# Patient Record
Sex: MALE | Race: WHITE | NOT HISPANIC OR LATINO | Employment: FULL TIME | URBAN - METROPOLITAN AREA
[De-identification: names, ages, dates, MRNs, and addresses within clinical notes are randomized per-mention and may not be internally consistent; named-entity substitution may affect disease eponyms.]

---

## 2017-01-09 ENCOUNTER — HOSPITAL ENCOUNTER (OUTPATIENT)
Dept: BEHAVIORAL HEALTH | Facility: CLINIC | Age: 32
Discharge: HOME | End: 2017-01-09
Attending: SOCIAL WORKER | Admitting: SOCIAL WORKER
Payer: COMMERCIAL

## 2017-01-09 ENCOUNTER — HOSPITAL ENCOUNTER (OUTPATIENT)
Dept: BEHAVIORAL HEALTH | Facility: CLINIC | Age: 32
End: 2017-01-09
Attending: SOCIAL WORKER
Payer: COMMERCIAL

## 2017-01-09 PROBLEM — F19.20 CHEMICAL DEPENDENCY (H): Status: ACTIVE | Noted: 2017-01-09

## 2017-01-09 PROCEDURE — H2035 A/D TX PROGRAM, PER HOUR: HCPCS | Mod: HQ

## 2017-01-09 PROCEDURE — H0001 ALCOHOL AND/OR DRUG ASSESS: HCPCS

## 2017-01-09 ASSESSMENT — PAIN SCALES - GENERAL: PAINLEVEL: MODERATE PAIN (4)

## 2017-01-09 ASSESSMENT — ANXIETY QUESTIONNAIRES
1. FEELING NERVOUS, ANXIOUS, OR ON EDGE: NEARLY EVERY DAY
5. BEING SO RESTLESS THAT IT IS HARD TO SIT STILL: MORE THAN HALF THE DAYS
6. BECOMING EASILY ANNOYED OR IRRITABLE: SEVERAL DAYS
7. FEELING AFRAID AS IF SOMETHING AWFUL MIGHT HAPPEN: MORE THAN HALF THE DAYS
4. TROUBLE RELAXING: MORE THAN HALF THE DAYS
3. WORRYING TOO MUCH ABOUT DIFFERENT THINGS: MORE THAN HALF THE DAYS
GAD7 TOTAL SCORE: 15
2. NOT BEING ABLE TO STOP OR CONTROL WORRYING: NEARLY EVERY DAY

## 2017-01-09 NOTE — PROGRESS NOTES
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 insight into the effects his use has had on his physical and mental health. Pt displays moderate vulnerability for further substance use.       DIMENSION VI - Recovery Environment   1. Are you employed/attending school? Tell me about that.     He was working 8:30-3pm Monday through Friday.    2A. Describe a typical day; evening for you. Work, school, social, leisure, volunteer, spiritual practices. Include time spent obtaining, using, recovering from drugs or alcohol. (DSM)     \"wake up, use, go to work, maybe use around lunch time, work, leave work, use, go home.\"    2B. How often do you spend more time than you planned using or use more than you planned? (DSM)     \"not really. I made sure everything was made up.\"    3. How important is using to your social connections? Do many of your family or friends use?     Friends: no    4A. Are you currently in a significant relationship?     Yes.  4B. How long? 2 years. But have been off and on for the past 9 years.    4C. Sexual Orientation:     Heterosexual    5A. Who do you live with?      His girlfriend and her 4 year old son.    5B. Tell me about their alcohol/drug use and mental health issues.     She drinks a little and is stopping. She has " "not used heroin in 7-8 years.    5C. Are you concerned for your safety there? No    5D. Are you concerned about the safety of anyone else who lives with you? No    6A. Do you have children who live with you?     Yes.  (Ask follow-up questions to determine the person s relationship and responsibility, both legal and care giving; and what arrangements are made for supervision for the children when the person is not available.) 4 year old step-son    6B. Do you have children who do not live with you?     No    7A. Who supports you in making changes in your alcohol or drug use? What are they willing to do to support you? Who is upset or angry about you making changes in your alcohol or drug use? How big a problem is this for you?      \"friends and family.\"    7B. This table is provided to record information about the person s relationships and available support It is not necessary to ask each item; only to get a comprehensive picture of their support system.  How often can you count on the following people when you need someone?   Partner / Spouse Always supportive   Parent(s)/Aunt(s)/Uncle(s)/Grandparents Usually supportive- mom; Sometimes- dad   Sibling(s)/Cousin(s) N/A   Child(daniel) N/A   Other relative(s) N/A   Friend(s)/neighbor(s) Always supportive   Child(daniel) s father(s)/mother(s) N/A   Support group member(s) Usually supportive   Community of gracie members Always supportive   /counselor/therapist/healer Always supportive   Other (specify) N/A     8A. What is your current living situation?     He lives with his girlfriend and her 4 year old son in a place they rent.    8B. What is your long term plan for where you will be living?     \"We want to buy the house.\"    8C. Tell me about your living environment/neighborhood? Ask enough follow up questions to determine safety, criminal activity, availability of alcohol and drugs, supportive or antagonistic to the person making changes.      No " concerns    9. Criminal justice history: Gather current/recent history and any significant history related to substance use--Arrests? Convictions? Circumstances? Alcohol or drug involvement? Sentences? Still on probation or parole? Expectations of the court? Current court order? Any sex offenses - lifetime? What level? (DSM)    1 DUI in either  or     10. What obstacles exist to participating in treatment? (Time off work, childcare, funding, transportation, pending shelter time, living situation)     Some work related commitments.     Dimension VI Ratings   Recovery environment - The placing authority must use the criteria in Dimension VI to determine a client s recovery environment.   RISK DESCRIPTIONS - Severity ratin Client is engaged in structured, meaningful activity, but peers, family, significant other, and living environment are unsupportive, or there is criminal justice involvement by the client or among the client s peers, significant others, or in the client s living environment.    REASONS SEVERITY WAS ASSIGNED - (What support does the person have for making changes? What structure/stability does the person have in his or her daily life that will increase the likelihood that changes can be sustained? What problems exist in the person s environment that will jeopardize getting/staying clean and sober?)     The patient currently lives with his girlfriend and her son.  The patient denied having any concerns regarding his immediate living environment or neighborhood.  The patient reported having relationship conflict with his girlfriend and his parents due to his ongoing substance abuse issues.  The patient identified as being heterosexual and he reported being with his girlfriend for the past 2 years.  The patient reported having a history of legal issues, including a DUI when he was 21 years old. He was also charged with gross misdemeanor theft, and arrested but not charged with possession. He is  not on probation.  The patient reported that 90% of his use of heroin had been done alone.  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 He stated he is not planning on going back to work until he feels more stable in his recovery.  The patient reported having some increased financial stress due to not having any extra money due to not working.  The patient lacks a current sober peer support network.         Client Choice/Exceptions   Would you like services specific to language, age, gender, culture, Adventism preference, race, ethnicity, sexual orientation or disability?  No    What particular treatment choices and options would you like to have? outpatient    Do you have a preference for a particular treatment program? [facility]    Criteria for Diagnosis     Criteria for Diagnosis  DSM-5 Criteria for Substance Use Disorder  Instructions: Determine whether the client currently meets the criteria for Substance Use Disorder using the diagnostic criteria in the DSM-V pp.[phone]. Current means during the most recent 12 months outside a facility that controls access to substances    Category of Substance Severity (ICD-10 Code / DSM 5 Code)     Alcohol Use Disorder NA   Cannabis Use Disorder NA   Hallucinogen Use Disorder NA   Inhalant Use Disorder NA   Opioid Use Disorder Severe   (F11.20) (304.00)   Sedative, Hypnotic, or Anxiolytic Use Disorder NA   Stimulant Related Disorder NA   Tobacco Use Disorder Moderate   (F17.200) (305.1)   Other (or unknown) Substance Use Disorder NA       Collateral Contact Summary   Number of contacts made: 2    Contact with referring person:  NA.    If court related records were reviewed, summarize here: NA    Information from collateral contacts supported/largely agreed with information from the client and associated risk ratings.      Rule 25 Assessment Summary and Plan   's Recommendation    1)  Complete an Intensive Outpatient CD Treatment  Program, such as [facility]'s Co-Occurring Day Program with Cristiano Hairston.  2)  Abstain from all mood-altering chemicals unless prescribed by a licensed provider.   3)  Attend, at minimum, 2 weekly 12-step support group meetings.     4)  Actively work with a male sponsor on a weekly basis.   5)  Follow all the recommendations of your treatment/medical providers.  6)  Patient would benefit from individual psychotherapy and continuing to see his psychiatrist as scheduled.        Collateral Contacts     Name:    [facility]   Relationship:    EMR   Phone Number:     Releases:         The patient's medical record at [facility] was reviewed and the information contained in the medical record supported the patient's account of his chemical use history and chemical use consequences.      Collateral Contacts     Name:    [facility]   Relationship:    CD tx   Phone Number:       Releases:    Yes     The CD evaluation form was reviewed and the information contained in the CD evaluation form supported the patient's account of his chemical use history and chemical use consequences.    ollateral Contacts      A problematic pattern of alcohol/drug use leading to clinically significant impairment or distress, as manifested by at least two of the following, occurring within a 12-month period:    Alcohol/drug is often taken in larger amounts or over a longer period than was intended.  There is a persistent desire or unsuccessful efforts to cut down or control alcohol/drug use  A great deal of time is spent in activities necessary to obtain alcohol, use alcohol, or recover from its effects.  Craving, or a strong desire or urge to use alcohol/drug  Recurrent alcohol/drug use resulting in a failure to fulfill major role obligations at work, school or home.  Continued alcohol use despite having persistent or recurrent social or interpersonal problems caused or exacerbated by the effects of alcohol/drug.  Important social,  occupational, or recreational activities are given up or reduced because of alcohol/drug use.  Recurrent alcohol/drug use in situations in which it is physically hazardous.  Alcohol/drug use is continued despite knowledge of having a persistent or recurrent physical or psychological problem that is likely to have been caused or exacerbated by alcohol.  Tolerance, as defined by either of the following: A need for markedly increased amounts of alcohol/drug to achieve intoxication or desired effect.  Withdrawal, as manifested by either of the following: The characteristic withdrawal syndrome for alcohol/drug (refer to Criteria A and B of the criteria set for alcohol/drug withdrawal).      Specify if: In early remission:  After full criteria for alcohol/drug use disorder were previously met, none of the criteria for alcohol/drug use disorder have been met for at least 3 months but for less than 12 months (with the exception that Criterion A4,  Craving or a strong desire or urge to use alcohol/drug  may be met).     In sustained remission:   After full criteria for alcohol use disorder were previously met, non of the criteria for alcohol/drug use disorder have been met at any time during a period of 12 months or longer (with the exception that Criterion A4,  Craving or strong desire or urge to use alcohol/drug  may be met).   Specify if:   This additional specifier is used if the individual is in an environment where access to alcohol is restricted.    Mild: Presence of 2-3 symptoms    Moderate: Presence of 4-5 symptoms    Severe: Presence of 6 or more symptoms
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
## 2017-01-10 ASSESSMENT — PATIENT HEALTH QUESTIONNAIRE - PHQ9: SUM OF ALL RESPONSES TO PHQ QUESTIONS 1-9: 12

## 2017-01-10 ASSESSMENT — ANXIETY QUESTIONNAIRES: GAD7 TOTAL SCORE: 15

## 2017-01-10 NOTE — PROGRESS NOTES
Male  Orientation to [facility]  Date: 1/9/2017  Time: 7:30pm Duration: 1 hour  D- Client attended orientation. He was provided with the orientation packet which included: Program philosophy, treatment goals, program schedules, education components, using treatment materials, program rules, client rights/responsibilities, information on HIV, STDS, Hepatitis, TB, information on use while pregnant, Nalaxone (Narcan) and opioid education information, program abuse prevention plan/reporting protocol, client grievance procedure, risks of treatment, confidentiality, treatment agreement, facility tour, safety information and information on co-occurring disorders. Client was also given the business card of his counselor and was told to contact him if he has any questions. Client was introduced to the Stages of Change Model and Post-Acute Withdrawal.   I- Signing of paperwork, tour of facility and treatment planning session. Goals developed this session included finding a sober support network. Client was provided with psychoeducation about Stages of Change, Post-Acute Withdrawal and was asked about where he feels he is in the stages of change. Client was informed about what each stage of change means.  A- Client was engaged and alert. He appears to be in the preparation stage as he is following prior treatment recommendations.   P-  Client will review orientation paperwork and materials.   MARIA GUADALUPE Proctor

## 2017-01-10 NOTE — PROGRESS NOTES
Outcome of vulnerable Adult Assessment for Outpatients:    1.  Do you have a physical, emotional or mental infirmity or dysfunction?       No    2.  Does this issue impair your ability to provide for your own care without help, including providing yourself with food, shelter, clothing, healthcare or supervision?       No      3.  Because of this issue, I need assistance to protect myself from maltreatment by others.      No    Based on the above information:    This person is not a functional Vulnerable Adult according to [place] Statute 626.5572 subdivision 21.      MARIA GUADALUPE Proctor
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
## 2017-01-18 ENCOUNTER — HOSPITAL ENCOUNTER (OUTPATIENT)
Dept: BEHAVIORAL HEALTH | Facility: CLINIC | Age: 32
End: 2017-01-18
Attending: SOCIAL WORKER
Payer: COMMERCIAL

## 2017-01-18 PROCEDURE — H2035 A/D TX PROGRAM, PER HOUR: HCPCS | Mod: HQ
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
## 2017-01-19 ENCOUNTER — HOSPITAL ENCOUNTER (OUTPATIENT)
Dept: BEHAVIORAL HEALTH | Facility: CLINIC | Age: 32
End: 2017-01-19
Attending: SOCIAL WORKER
Payer: COMMERCIAL

## 2017-01-19 PROCEDURE — H2035 A/D TX PROGRAM, PER HOUR: HCPCS | Mod: HQ

## 2017-01-24 ENCOUNTER — HOSPITAL ENCOUNTER (OUTPATIENT)
Dept: BEHAVIORAL HEALTH | Facility: CLINIC | Age: 32
End: 2017-01-24
Attending: SOCIAL WORKER
Payer: COMMERCIAL

## 2017-01-24 PROCEDURE — H2035 A/D TX PROGRAM, PER HOUR: HCPCS | Mod: HQ

## 2017-01-26 ENCOUNTER — HOSPITAL ENCOUNTER (OUTPATIENT)
Dept: BEHAVIORAL HEALTH | Facility: CLINIC | Age: 32
End: 2017-01-26
Attending: SOCIAL WORKER
Payer: COMMERCIAL

## 2017-01-26 PROCEDURE — H2035 A/D TX PROGRAM, PER HOUR: HCPCS | Mod: HQ

## 2017-01-26 NOTE — PROGRESS NOTES
Adult CD Treatment Plan Review/Weekly Progress Note     Treatment Plan Review completed on:  01/26/17    Attendance Dates: 01/23/17 - 01/29/17    Total Attendance: 5 MONDAY TUESDAY WEDNESDAY THURSDAY FRIDAY SATURDAY SUNDAY Total   Group Therapy   2    2       4   Speciality Groups*                  1:1                Family Program                  Noel                  Phase II                  Absent    excused           Total             *Specialty Groups include Mental Health Care, Assertiveness and Communication, Sobriety Maintenance Skills,   Spiritual Care, Stress Management, Relapse Prevention, Family Systems.                    Learning Style: Demonstration    Staff Members contributing: MARIA GUADALUPE Cheng, LMFT; Penny Castaneda, intern                         Received Supervision: Yes    Client: contributed to goals and plan    Did Client receive a copy of treatment plan/revised plan:  Yes    Changes to Treatment Plan:  none    Client agrees with plan/revised plan:  Yes    Any changes in Vulnerable Adult Status:  No    Substance Use Disorders:  F11.20, Opioid Use Disorder, severe  F17.20  Tobacco Use Disorder, moderate      ASAM Risk Ratings and Data       DIMENSION 1: Acute Intoxication/Withdrawal  The client's ability to cope with withdrawal symptoms and current state of intoxication       Acute Intoxication/Withdrawal - Current Risk Factor:  0    Reporting sober date of 1/11/17    Goals: Report to counselor any substance use not prescribed by a doctor.     Data:  Client showed no signs or symptoms of intoxication or withdrawal.       DIMENSION 2:  Biomedical Conditions and Complaints  The degree to which any physical disorder would interfere with treatment for substance abuse and the client's ability to tolerate any related discomfort     Biomedical Conditions and Complaints - Current Risk Factor:  1    Data:  Client denies having any chronic biomedical conditions that would interfere with  "treatment or any recovery skills training/workshop. Client reports having GI issues that started in December 2016 when he was hospitalized. He reports he has a PCP and is seeing a GI specialist for his stomach.       DIMENSION 3:  Emotional/Behavioral/Cognitive Conditions and Complications  The degree to which any condition or complications are likely to interfere with treatment for substance abuse or with function in significant life areas and the likelihood of risk of harm to self or others.     Emotional/Behavioral - Current Risk Factor:  2    DSM-5 Diagnoses:   Client reports having mental health diagnosis of depression, anxiety, ADHD, and a learning disorder (Writting expressive disorder).       Suicide Assessment:  Emergent? No  Urgent / Non-Emergent?  No  Present / Non- Urgent? No      No Current Risk? Yes, Evaluation Counselors - Document in Epic / SBAR to counselor \"No identified risk\"     Goals:   1. Learn how to manage unpleasant/painful thoughts, feelings, sensation in a more helpful way using mindfulness, acceptance and defusion.  2. Using the weekly Check-in worksheet, report severity of any mental health issues.  3. Meet regular 1 on with his counselor/therapist.     Data:  Client reported no thoughts of self-harm.  Client reported a 6/10 for depression and anxiety and 3/10 for mood swings and irritability. Client did set up an individual meeting for next week.       DIMENSION 4:  Readiness to Change  Consider the amount of support and encouragement necessary to keep the client involved in treatment.     Readiness to Change - Current Risk Factor:  2    Goals:    1. Increase internal motivation to stay sober by focusing and particular values week after week as noted in weekly Check-in/Action Plan worksheet.  2. Define Values based on what he wants his life to be about.    Data:  Client identified value of compassion to work on as a focus to move his live forward and to stay sober.       DIMENSION 5:  " Relapse/Continued Use/Continued Problem Potential  Consider the degree to which the client recognizes relapse issues and has the skills to prevent relapse of either substance use or mental health problems.     Relapse/Continued Use/Continued Problem Potential - Current Risk Factor:  3    Goals:  Understand use and relapse patterns and learn how to manage urges and cravings to use    Data:  Client reported his cravings and urges to use to be at 1/10. Client reported that his focus is to move his life forward and to find better ways to cope with life's up and downs.      DIMENSION 6:  Recovery Environment  Consider the degree to which key areas of the client's life are supportive of or antagonistic to treatment participation and recovery.     Recovery Environment - Current Risk Factor:  3    Support group attended this week: yes, he attended 2 meetings in [facility] this past week.    Did family agree to attend family week:  Yet to talk to client about this.    If yes:  na    Goals:    Build a strong sober support network and report to counselor on a weekly basis how that is going through the weekly Check-In worksheet.     Data:  Client rated his living situation as 1/10 for stress level reporting that his gf/financee is very supportive. Client does have issues with his father which he finds very stressful.           Intervention:    Therapy group with check-ins  Check-in worksheet  New 7 day Action Plan worksheet  Discussion on readings- More of the same gets more of the same, I can't , I can and The purpose of learning is growth.  Handouts and discussion on Passengers on a Bus, The Matrix and 12 Tips on Sleep and Early Recovery.  Discussion on Perspective  Mindful meditation     Assessment:   Stages of Change Model: Contemplation  Appears/Sounds: Cooperative, Engaged  Client attended 2 out of 3 sessions this week, missed Wednesday due to a doctor's appointment. Client does not share much in group but has considerably  warmed up to the group this past week.     Plan:    Complete Recovery Action Plan and hand in by next Thursday.      Penny Castaneda, Intern  Leyv Hairston, MARIA GUADALUPE, LMFT

## 2017-01-31 ENCOUNTER — HOSPITAL ENCOUNTER (OUTPATIENT)
Dept: BEHAVIORAL HEALTH | Facility: CLINIC | Age: 32
End: 2017-01-31
Attending: SOCIAL WORKER
Payer: COMMERCIAL

## 2017-01-31 PROCEDURE — H2035 A/D TX PROGRAM, PER HOUR: HCPCS

## 2017-01-31 PROCEDURE — H2035 A/D TX PROGRAM, PER HOUR: HCPCS | Mod: HQ

## 2017-02-01 ENCOUNTER — HOSPITAL ENCOUNTER (OUTPATIENT)
Dept: BEHAVIORAL HEALTH | Facility: CLINIC | Age: 32
End: 2017-02-01
Attending: SOCIAL WORKER
Payer: COMMERCIAL

## 2017-02-01 PROCEDURE — H2035 A/D TX PROGRAM, PER HOUR: HCPCS | Mod: HQ

## 2017-02-02 ENCOUNTER — HOSPITAL ENCOUNTER (OUTPATIENT)
Dept: BEHAVIORAL HEALTH | Facility: CLINIC | Age: 32
End: 2017-02-02
Attending: SOCIAL WORKER
Payer: COMMERCIAL

## 2017-02-02 PROCEDURE — H2035 A/D TX PROGRAM, PER HOUR: HCPCS | Mod: HQ

## 2017-02-02 NOTE — PROGRESS NOTES
Adult CD Treatment Plan Review/Weekly Progress Note     Treatment Plan Review completed on:  02/02/17    Attendance Dates: 01/30/17 - 02/05/17    Total # of Group Sessions: 8     MONDAY TUESDAY WEDNESDAY THURSDAY FRIDAY SATURDAY SUNDAY Total   Group Therapy   2  2  2       6   Speciality Groups*                  1:1   1          1   Family Program                  [place]                  Phase II                  Absent               Total        7     *Specialty Groups include Mental Health Care, Assertiveness and Communication, Sobriety Maintenance Skills,   Spiritual Care, Stress Management, Relapse Prevention, Family Systems.                    Learning Style: Demonstration    Staff Members contributing: MARIA GUADALUPE Cheng, LMFT; Penny Castaneda, intern                         Received Supervision: Yes    Client: contributed to goals and plan    Did Client receive a copy of treatment plan/revised plan:  Yes    Changes to Treatment Plan:  none    Client agrees with plan/revised plan:  Yes    Any changes in Vulnerable Adult Status:  No    Substance Use Disorders:  F11.20, Opioid Use Disorder, severe  F17.20  Tobacco Use Disorder, moderate      ASAM Risk Ratings and Data       DIMENSION 1: Acute Intoxication/Withdrawal  The client's ability to cope with withdrawal symptoms and current state of intoxication       Acute Intoxication/Withdrawal - Current Risk Factor:  0    Reporting sober date of 12/04/16    Goals: Report to counselor any substance use not prescribed by a doctor.     Data:  Client showed no signs or symptoms of intoxication or withdrawal.       DIMENSION 2:  Biomedical Conditions and Complaints  The degree to which any physical disorder would interfere with treatment for substance abuse and the client's ability to tolerate any related discomfort     Biomedical Conditions and Complaints - Current Risk Factor:  1    Data:  Client denies having any chronic biomedical conditions that would interfere with  "treatment or any recovery skills training/workshop. Client reports having GI issues that started in December 2016 when he was hospitalized. He reports he has a PCP and is seeing a GI specialist for his stomach. Client reports nausea and being tired.       DIMENSION 3:  Emotional/Behavioral/Cognitive Conditions and Complications  The degree to which any condition or complications are likely to interfere with treatment for substance abuse or with function in significant life areas and the likelihood of risk of harm to self or others.     Emotional/Behavioral - Current Risk Factor:  2    DSM-5 Diagnoses:   Client is diagnosis with Persistent Depressive Disorder (Dysthymia) with symptoms of insomnia, low energy and fatigue, low self-esteem, poor concentration and feelings of hopelessness. Client also has a diagnosis of Generalized Anxiety Disorder with symptoms of restlessness, being easily fatigued, difficulty concentrating, irritability and sleep disturbance with difficulty falling asleep. Client reported also being diagnosed in the past with ADHD, and a learning disorder (Writting expressive disorder). Client is current taking Cymbata, Lorazepam and Temazefam.     Suicide Assessment:  Emergent? No  Urgent / Non-Emergent?  No  Present / Non- Urgent? No      No Current Risk? Yes, Evaluation Counselors - Document in Epic / SBAR to counselor \"No identified risk\"     Goals:   1. Learn how to manage unpleasant/painful thoughts, feelings, sensation in a more helpful way using mindfulness, acceptance and defusion.  2. Using the weekly Check-in worksheet, report severity of any mental health issues.  3. Meet regular 1 on 1 with his counselor/therapist.     Data:  Client reported no thoughts of self-harm.  Client reported symptoms of disturbed sleep, fatigue, difficulty concentrating, depression and anxiety. In coping with these symptoms, he reported to \"talk, breathe, treatment, psychiatrist and psychologist.\" Client " "participated in discussion on Self-compassion. He shared he would \"clean around the house\" with the goal in this being self-compassion. Through exercises on Shame, client also identified shame is also at times \"useful,\" but \"guilt is not.\" The group reading for the day was also related to Shame. Client interpreted the message here to be, \"don't give up, keep trying.\" Client also expressed feeling that \"sometimes actions are better than words.\" Client participated in a group guided meditation. He reported that he found meditation and mindfulness helpful and committed to practicing it more.      DIMENSION 4:  Readiness to Change  Consider the amount of support and encouragement necessary to keep the client involved in treatment.     Readiness to Change - Current Risk Factor:  2    Goals:    1. Increase internal motivation to stay sober by focusing and particular values week after week as noted in weekly Check-in/Action Plan worksheet.  2. Define Values based on what he wants his life to be about.    Data:  Client identified values that he worked on this past week with his Recovery Action Plan. He specified taking care of mental health, physical health and cleaning house. Client shares also identifying with the need for self-compassion as discussed in group and in viewing the video on compassion and self-compassion. He shared group being beneficial as \"when feeling bad, all the laughter really helps.\" He added, \"the counseling has been great too.\"       DIMENSION 5:  Relapse/Continued Use/Continued Problem Potential  Consider the degree to which the client recognizes relapse issues and has the skills to prevent relapse of either substance use or mental health problems.     Relapse/Continued Use/Continued Problem Potential - Current Risk Factor:  3    Goals:  Understand use and relapse patterns and learn how to manage urges and cravings to use    Data:  Client reported his cravings and urges to use to be at 2 or 3/10. " "Client reported that his focus is to move his life forward and to find better ways to cope with life's up and downs. He stated his circumstances and triggers that caused them were being nervous, angry and scared. Client is working on using Urge Surfing as discussed in group last week as a way to cope with the urges and triggers to use.      DIMENSION 6:  Recovery Environment  Consider the degree to which key areas of the client's life are supportive of or antagonistic to treatment participation and recovery.     Recovery Environment - Current Risk Factor:  3    Support group attended this week: yes, he attended 2 meetings in [facility] this past week.    Did family agree to attend family week:  Client feels that this is not needed given his prior treatments and current state of his family relationships which are strained.     If yes:  na    Goals:    Build a strong sober support network and report to counselor on a weekly basis how that is going through the weekly Check-In worksheet.     Data:  Client rated his living situation as 1/10 for stress level reporting that his gf/fiancee is very supportive. Client is attending additional sober support meetings on Tuesday and Wednesday meetings in [facility]. He reports planning to continue to build his network with \"talk to more people in recovery and try to make more meetings.\"           Intervention:    Therapy group with check-ins  Check-in worksheet  New 7 day Action Plan worksheet  Discussion on beginning sessions meditation readings- Shame, Being a child again, Loneliness is part of the human condition  Handouts- What is shame?, Definitions and stages of Self-compassion, Self-compassion  Video- Self-compassion by Larisa Cat  Mindful meditation     Assessment:   Stages of Change Model: Contemplation  Appears/Sounds: Cooperative, Engaged  Client attended all sessions this week. Client has increased sharing some in group this week and continues to bond with his peer members. " "Client is thoughtful in his observations around work and topics presented in group. Client shared wanting to explore more around boundaries and how to \"deal with emotions when I can't change the other person individually.\"    Plan:    Complete Recovery Action Plan each week which identifies the value to be worked on and the value based action to be taken that day and hand in by next Thursday.      Penny Castaneda, Intern  Levy Hairston, [facility], LMFT          "

## 2017-02-08 ENCOUNTER — HOSPITAL ENCOUNTER (OUTPATIENT)
Dept: BEHAVIORAL HEALTH | Facility: CLINIC | Age: 32
End: 2017-02-08
Attending: SOCIAL WORKER
Payer: COMMERCIAL

## 2017-02-08 PROCEDURE — H2035 A/D TX PROGRAM, PER HOUR: HCPCS | Mod: HQ

## 2017-02-09 ENCOUNTER — HOSPITAL ENCOUNTER (OUTPATIENT)
Dept: BEHAVIORAL HEALTH | Facility: CLINIC | Age: 32
End: 2017-02-09
Attending: SOCIAL WORKER
Payer: COMMERCIAL

## 2017-02-09 PROCEDURE — H2035 A/D TX PROGRAM, PER HOUR: HCPCS | Mod: HQ

## 2017-02-09 NOTE — PROGRESS NOTES
Adult CD Treatment Plan Review/Weekly Progress Note     Treatment Plan Review completed on:  02/09/17    Attendance Dates: 02/06/17 - 02/12/17    Total # of Group Sessions: 11 MONDAY TUESDAY WEDNESDAY THURSDAY FRIDAY SATURDAY SUNDAY Total   Group Therapy   2  2  2       6   Speciality Groups*                  1:1                Family Program                  [place]                  Phase II                  Absent               Total        6     *Specialty Groups include Mental Health Care, Assertiveness and Communication, Sobriety Maintenance Skills,   Spiritual Care, Stress Management, Relapse Prevention, Family Systems.                    Learning Style: Demonstration    Staff Members contributing: MARIA GUADALUPE Cheng, LMFT; Penny Castaneda, intern                         Received Supervision: Yes    Client: contributed to goals and plan    Did Client receive a copy of treatment plan/revised plan:  Yes    Changes to Treatment Plan:  none    Client agrees with plan/revised plan:  Yes    Any changes in Vulnerable Adult Status:  No    Substance Use Disorders:  F11.20, Opioid Use Disorder, severe  F17.20  Tobacco Use Disorder, moderate      ASAM Risk Ratings and Data       DIMENSION 1: Acute Intoxication/Withdrawal  The client's ability to cope with withdrawal symptoms and current state of intoxication       Acute Intoxication/Withdrawal - Current Risk Factor:  0    Reporting sober date of 12/04/16    Goals: Report to counselor any substance use not prescribed by a doctor.     Data:  Client showed no signs or symptoms of intoxication or withdrawal.       DIMENSION 2:  Biomedical Conditions and Complaints  The degree to which any physical disorder would interfere with treatment for substance abuse and the client's ability to tolerate any related discomfort     Biomedical Conditions and Complaints - Current Risk Factor:  1    Data:  Client denies having any chronic biomedical conditions that would interfere with  "treatment or any recovery skills training/workshop. Client reports having GI issues that started in December 2016 when he was hospitalized. He reports he has a PCP and is seeing a GI specialist for his stomach.       DIMENSION 3:  Emotional/Behavioral/Cognitive Conditions and Complications  The degree to which any condition or complications are likely to interfere with treatment for substance abuse or with function in significant life areas and the likelihood of risk of harm to self or others.     Emotional/Behavioral - Current Risk Factor:  2    DSM-5 Diagnoses:   Client is diagnosis with Persistent Depressive Disorder (Dysthymia) with symptoms of insomnia, low energy and fatigue, low self-esteem, poor concentration and feelings of hopelessness. Client also has a diagnosis of Generalized Anxiety Disorder with symptoms of restlessness, being easily fatigued, difficulty concentrating, irritability and sleep disturbance with difficulty falling asleep. Client reported also being diagnosed in the past with ADHD, and a learning disorder (Writting expressive disorder). Client is current taking Cymbata, Lorazepam and Temazefam.     Suicide Assessment:  Emergent? No  Urgent / Non-Emergent?  No  Present / Non- Urgent? No      No Current Risk? Yes, Evaluation Counselors - Document in Epic / SBAR to counselor \"No identified risk\"     Goals:   1. Learn how to manage unpleasant/painful thoughts, feelings, sensation in a more helpful way using mindfulness, acceptance and defusion.  2. Using the weekly Check-in worksheet, report severity of any mental health issues.  3. Meet regular 1 on 1 with his counselor/therapist.     Data:  Client reported no thoughts of self-harm.  Client reported symptoms of depression and anxiety at 4-5/10 and fatigue and difficulty concentrating at 6/10. Client reports coping with these symptoms through trying to \"sleep earlier and talk.\" Client shared, \"I like this group. It is very helpful. Acceptance " "is great. Not trying to stuff or disappear my feelings.\"      DIMENSION 4:  Readiness to Change  Consider the amount of support and encouragement necessary to keep the client involved in treatment.     Readiness to Change - Current Risk Factor:  2    Goals:    1. Increase internal motivation to stay sober by focusing and particular values week after week as noted in weekly Check-in/Action Plan worksheet.  2. Define Values based on what he wants his life to be about.    Data:  Client identified values that he worked on this past week with his Recovery Action Plan. He specified honest, open and willing. Client shared recognition of being \"more judgmental of self.\" Client shared, \"the first couple of times I went to treatment I didn't care. Now I have desire and strong motivation to say sober.\"       DIMENSION 5:  Relapse/Continued Use/Continued Problem Potential  Consider the degree to which the client recognizes relapse issues and has the skills to prevent relapse of either substance use or mental health problems.     Relapse/Continued Use/Continued Problem Potential - Current Risk Factor:  3    Goals:  Understand use and relapse patterns and learn how to manage urges and cravings to use    Data:  Client reported his cravings and urges to use to be at 3/10. Client reported that his focus is more acceptance work. Client reacted to reading on Feelings and Triggers, Acting Instead of Reacting. In coping with cravings, he recognized he would often be \"getting in my head and not realizing thoughts are thoughts.\"      DIMENSION 6:  Recovery Environment  Consider the degree to which key areas of the client's life are supportive of or antagonistic to treatment participation and recovery.     Recovery Environment - Current Risk Factor:  3    Support group attended this week: yes, he attended 2 meetings in [place] this past week.    Did family agree to attend family week:  Client feels that this is not needed given his prior " treatments and current state of his family relationships which are strained.     If yes:  na    Goals:    Build a strong sober support network and report to counselor on a weekly basis how that is going through the weekly Check-In worksheet.     Data:  Client rated his living situation as 1/10 for stress level reporting that he has solid support. Client has been attending additional support meetings through [facility].           Intervention:    Therapy group with check-ins  Check-in worksheet  New 7 day Action Plan worksheet  Discussion on beginning session mediation readings- Willingness, Guilt and Feelings and Triggers, Acting Instead of Reacting  Handouts- Willingness and the 4-steps, How I Typically Act Towards Myself in Difficult Times  Video - The Secret Life of Control, Jesse Thomas     Assessment:   Stages of Change Model: Contemplation  Appears/Sounds: Cooperative, Engaged  Client attended all sessions this week. Client asks insightful questions on materials and identifies with ACT methods. Client expressed wanting to focus on more acceptance work, as he sees personal benefit from that.    Plan:    Complete Recovery Action Plan each week which identifies the value to be worked on and the value based action to be taken that day and hand in by next Thursday.      Penny Castaneda, Intern  Levy Hairston, [facility], LMFT

## 2017-02-15 ENCOUNTER — HOSPITAL ENCOUNTER (OUTPATIENT)
Dept: BEHAVIORAL HEALTH | Facility: CLINIC | Age: 32
End: 2017-02-15
Attending: SOCIAL WORKER
Payer: COMMERCIAL

## 2017-02-15 PROCEDURE — H2035 A/D TX PROGRAM, PER HOUR: HCPCS | Mod: HQ

## 2017-02-16 ENCOUNTER — HOSPITAL ENCOUNTER (OUTPATIENT)
Dept: BEHAVIORAL HEALTH | Facility: CLINIC | Age: 32
End: 2017-02-16
Attending: SOCIAL WORKER
Payer: COMMERCIAL

## 2017-02-16 PROCEDURE — H2035 A/D TX PROGRAM, PER HOUR: HCPCS | Mod: HQ

## 2017-02-18 NOTE — PROGRESS NOTES
Patient Safety Plan Template    Name:   Jeffery Palmer YOB: 1985 Age:  31 year old MR Number:  [id number]   Step 1: Warning signs (Thoughts, images, mood, situation, behavior) that a crisis may be developin. Dealing his father     2. Thoughts of inadequacy      3. NA     Step 2: Internal coping strategies - Things I can do to take my mind off of my problems without contacting another person (relaxation technique, physical activity):     1. Hoahaoism and prayer     2. Mindful breathing     3. Talk about it     Step 3: People and social settings that provide distraction:     1. Name: Elizabeth Watson   Phone: [phone]   2. Name: Alexa Palmer   Phone: [phone]   3. Place: [facility]   4. Place: [facility]     The one thing that is most important to me and worth living for is: my gf, her son, parents & brother, friends, honesty, openness, willingness     Step 4: People whom I can ask for help:     1. Name: Elizabeth Watson   Phone: NA     2. Name: Alexa Palmer   Phone: NA     3. Name: Tang Jernigan   Phone: [phone]     Step 5: Professionals or agencies I can contact during a crisis:     1. Clinician Name: Dr. Campos   Phone: [phone]   Clinician Pager or Emergency Contact #: NA     2. Clinician Name: Savannah Gonzalez   Phone: [phone]     Clinician Pager or Emergency Contact #: NA     3. Local Urgent Care Services: [place] [facility]    Urgent Care Services Address: [facility]    Urgent Care Services Phone: NA     4. Suicide Prevention Lifeline Phone: [phone] ([phone])     Step 6: Making the environment safe:     1. Continue treatment and stay open and willing     2. Meetings     Safety Plan Template 2008 Alexa Hood and Herberth Mead is reprinted with the express permission of the authors.  No portion of the Safety Plan Template may be reproduced without the express, written permission.  You can contact the authors at [email or web address] or  [email or web address].
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
## 2017-02-21 ENCOUNTER — HOSPITAL ENCOUNTER (OUTPATIENT)
Dept: BEHAVIORAL HEALTH | Facility: CLINIC | Age: 32
End: 2017-02-21
Attending: SOCIAL WORKER
Payer: COMMERCIAL

## 2017-02-21 PROCEDURE — H2035 A/D TX PROGRAM, PER HOUR: HCPCS | Mod: HQ

## 2017-02-23 ENCOUNTER — HOSPITAL ENCOUNTER (OUTPATIENT)
Dept: BEHAVIORAL HEALTH | Facility: CLINIC | Age: 32
End: 2017-02-23
Attending: SOCIAL WORKER
Payer: COMMERCIAL

## 2017-02-23 PROCEDURE — H2035 A/D TX PROGRAM, PER HOUR: HCPCS | Mod: HQ

## 2017-02-23 PROCEDURE — H2035 A/D TX PROGRAM, PER HOUR: HCPCS

## 2017-02-23 NOTE — PROGRESS NOTES
Session with Psychotherapist    LENORE Met with client on 2/23 from 3:30 - 4:00. Client asked for me to call his father which I did and let his father know that he is still in treatment and is engaged and doing well. Client reported that he is likes the Acceptance piece of ACT and I have him the handout on Acceptance: the Skill of Opening Up. Client talked a bit about his brother who is gonzalez and lives in [facility]. He is 9 years older and does not really connect well with him saying that his brother tends to be really judgmental but that he will do his best to share time with him while he is visiting. Client's brother is also in recovery.   I. Used MI with validation,  reflective listening and reframing techniques along with the above mentioned handout.  A. Client appears to continue to do well and has found the Acceptance part of ACT helpful.  P. Client scheduled another appointment for next week, 3/1, at 1:00.    MARIA GUADALUPE Cheng, LMFT
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
## 2017-02-28 ENCOUNTER — HOSPITAL ENCOUNTER (OUTPATIENT)
Dept: BEHAVIORAL HEALTH | Facility: CLINIC | Age: 32
End: 2017-02-28
Attending: SOCIAL WORKER
Payer: COMMERCIAL

## 2017-02-28 PROCEDURE — H2035 A/D TX PROGRAM, PER HOUR: HCPCS

## 2017-02-28 PROCEDURE — H2035 A/D TX PROGRAM, PER HOUR: HCPCS | Mod: HQ

## 2017-03-01 ENCOUNTER — HOSPITAL ENCOUNTER (OUTPATIENT)
Dept: BEHAVIORAL HEALTH | Facility: CLINIC | Age: 32
End: 2017-03-01
Attending: SOCIAL WORKER
Payer: COMMERCIAL

## 2017-03-01 PROCEDURE — H2035 A/D TX PROGRAM, PER HOUR: HCPCS | Mod: HQ

## 2017-03-02 ENCOUNTER — HOSPITAL ENCOUNTER (OUTPATIENT)
Dept: BEHAVIORAL HEALTH | Facility: CLINIC | Age: 32
End: 2017-03-02
Attending: SOCIAL WORKER
Payer: COMMERCIAL

## 2017-03-02 PROCEDURE — H2035 A/D TX PROGRAM, PER HOUR: HCPCS | Mod: HQ

## 2017-03-22 ENCOUNTER — HOSPITAL ENCOUNTER (OUTPATIENT)
Dept: BEHAVIORAL HEALTH | Facility: CLINIC | Age: 32
End: 2017-03-22
Attending: SOCIAL WORKER
Payer: COMMERCIAL

## 2017-03-22 PROCEDURE — H2035 A/D TX PROGRAM, PER HOUR: HCPCS
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
## 2017-05-29 ENCOUNTER — TRANSFERRED RECORDS (OUTPATIENT)
Dept: HEALTH INFORMATION MANAGEMENT | Facility: CLINIC | Age: 32
End: 2017-05-29

## 2017-09-15 ENCOUNTER — TRANSFERRED RECORDS (OUTPATIENT)
Dept: HEALTH INFORMATION MANAGEMENT | Facility: CLINIC | Age: 32
End: 2017-09-15

## 2017-09-19 ENCOUNTER — TELEPHONE (OUTPATIENT)
Dept: ORTHOPEDICS | Facility: CLINIC | Age: 32
End: 2017-09-19

## 2017-09-19 NOTE — TELEPHONE ENCOUNTER
Dr. Gill from [facility] apparently spoke with Dr. Thompson about seeing this patient for a tumor issue.  Isai CHONG from [facility] called and wanted to talk with Cheri CHONG before the appt.  Was made.  Call transferred to Cheri CHONG.

## 2017-09-20 ENCOUNTER — PRE VISIT (OUTPATIENT)
Dept: ORTHOPEDICS | Facility: CLINIC | Age: 32
End: 2017-09-20

## 2017-09-20 NOTE — TELEPHONE ENCOUNTER
1.  Date/reason for appt: 9/25/17 10:45AM - Squamous cell CA of bilateral hands    2.  Referring provider: Dr. Gill    3.  Call to patient (Yes / No - short description): no, pt is referred    4.  Previous care at / records requested from: [facility] -- faxed request for records/imaging

## 2017-09-21 NOTE — TELEPHONE ENCOUNTER
Records received from [facility].   Included  Office notes: 4/6/17, 5/30/17, 6/5/17, 8/2/17, 8/8/17, 8/11/17, 8/21/17, 8/24/17, 8/30/17, 9/15/17  ED notes: 5/29/17

## 2017-09-25 ENCOUNTER — OFFICE VISIT (OUTPATIENT)
Dept: ORTHOPEDICS | Facility: CLINIC | Age: 32
End: 2017-09-25

## 2017-09-25 VITALS — BODY MASS INDEX: 25.85 KG/M2 | HEIGHT: 74 IN | WEIGHT: 201.4 LBS

## 2017-09-25 DIAGNOSIS — S61.409A: Primary | ICD-10-CM

## 2017-09-25 PROBLEM — F32.9 MAJOR DEPRESSION: Status: ACTIVE | Noted: 2017-09-25

## 2017-09-25 PROBLEM — Z22.322 MRSA (METHICILLIN RESISTANT STAPH AUREUS) CULTURE POSITIVE: Status: ACTIVE | Noted: 2017-05-30

## 2017-09-25 RX ORDER — CLINDAMYCIN PHOSPHATE 10 MG/G
GEL TOPICAL
COMMUNITY
Start: 2017-05-29 | End: 2020-12-30

## 2017-09-25 RX ORDER — TIZANIDINE 2 MG/1
TABLET ORAL
COMMUNITY
Start: 2017-06-21 | End: 2017-09-25

## 2017-09-25 RX ORDER — CARISOPRODOL 350 MG/1
TABLET ORAL
COMMUNITY
Start: 2017-08-11 | End: 2020-12-30

## 2017-09-25 RX ORDER — LANSOPRAZOLE 30 MG/1
CAPSULE, DELAYED RELEASE ORAL
COMMUNITY
Start: 2017-04-17 | End: 2020-12-30

## 2017-09-25 ASSESSMENT — ENCOUNTER SYMPTOMS
SKIN CHANGES: 0
POOR WOUND HEALING: 0
INSOMNIA: 0
NERVOUS/ANXIOUS: 1
PANIC: 0
DEPRESSION: 1
DECREASED CONCENTRATION: 1
NAIL CHANGES: 0

## 2017-09-25 NOTE — MR AVS SNAPSHOT
After Visit Summary   2017    Jeffery Palmer    MRN: [id number]           Patient Information     Date Of Birth          1985        Visit Information        Provider Department      2017 10:45 AM Andi Thompson MD [facility]        Today's Diagnoses     Hand, open wound except fingers, complicated, unspecified laterality, initial encounter    -  1       Follow-ups after your visit        Your next 10 appointments already scheduled     Oct 05, 2017  2:00 PM CDT   (Arrive by 1:45 PM)   RETURN TUMOR VISIT with MD KRYSTIN [facility] ([facility])    [address] [place] [id number] [phone]              Who to contact     Please call your clinic at [phone] to:    Ask questions about your health    Make or cancel appointments    Discuss your medicines    Learn about your test results    Speak to your doctor   If you have compliments or concerns about an experience at your clinic, or if you wish to file a complaint, please contact [facility] Patient Relations at [phone] or email us at [email or web address]         Additional Information About Your Visit        MyChart Information     Wudyat is an electronic gateway that provides easy, online access to your medical records. With [employer], you can request a clinic appointment, read your test results, renew a prescription or communicate with your care team.     To sign up for Wudyat visit the website at [email or web address]   You will be asked to enter the access code listed below, as well as some personal information. Please follow the directions to create your username and password.     Your access code is: [email or web address]  Expires: 2017  6:30 AM     Your access code will  in 90 days. If you need help or a new code, please contact your [facility] or  "call [phone] for assistance.        Care EveryWhere ID     This is your Care EveryWhere ID. This could be used by other organizations to access your [facility] medical records  [id number]        Your Vitals Were     Height BMI (Body Mass Index)                1.867 m (6' 1.5\") 26.21 kg/m2           Blood Pressure from Last 3 Encounters:   No data found for BP    Weight from Last 3 Encounters:   No data found for Wt              Today, you had the following     No orders found for display         Today's Medication Changes          These changes are accurate as of: 9/25/17 11:59 PM.  If you have any questions, ask your nurse or doctor.               Stop taking these medicines if you haven't already. Please contact your care team if you have questions.     albuterol 108 (90 BASE) MCG/ACT Inhaler   Commonly known as:  PROAIR HFA/PROVENTIL HFA/VENTOLIN HFA   Stopped by:  Andi Thompson MD           azithromycin 250 MG tablet   Commonly known as:  ZITHROMAX   Stopped by:  Andi Thompson MD           guaiFENesin-codeine 100-10 MG/5ML Soln solution   Commonly known as:  ROBITUSSIN AC   Stopped by:  Andi Thompson MD           IMIPRAMINE HCL PO   Stopped by:  Andi Thompson MD           OMEPRAZOLE PO   Stopped by:  Andi Thompson MD           tiZANidine 2 MG tablet   Commonly known as:  ZANAFLEX   Stopped by:  Andi Thompson MD           TRAZODONE HCL PO   Stopped by:  Andi Thompson MD                    Primary Care Provider Office Phone # Fax #    Carmen MCCLELLAND MD Brent [phone]       [facility] [address] [place] [address]        Equal Access to Services     [facility]: Hadii aad ku hadasho Soomaali, waaxda luqadaha, qaybta kaalmada adeegyada, sonal barrera . So [facility] [phone].    ATENCIÓN: Si habla español, tiene a cardona disposición servicios gratuitos de asistencia lingüística. Llame al [phone].    We comply with applicable federal civil " rights laws and [place] laws. We do not discriminate on the basis of race, color, national origin, age, disability, sex, sexual orientation, or gender identity.            Thank you!     Thank you for choosing [facility]  for your care. Our goal is always to provide you with excellent care. Hearing back from our patients is one way we can continue to improve our services. Please take a few minutes to complete the written survey that you may receive in the mail after your visit with us. Thank you!             Your Updated Medication List - Protect others around you: Learn how to safely use, store and throw away your medicines at www.disposemymeds.org.          This list is accurate as of: 9/25/17 11:59 PM.  Always use your most recent med list.                   Brand Name Dispense Instructions for use Diagnosis    carisoprodol 350 MG tablet    SOMA     1 tab po qhs prn muscle spasm        clindamycin 1 % topical gel    CLINDAMAX          * CYMBALTA PO      Take 60 mg by mouth daily        * DULoxetine 30 MG EC capsule    CYMBALTA     Take 30 mg by mouth        LANsoprazole 30 MG CR capsule    PREVACID     One daily        * LORazepam 1 MG tablet    ATIVAN     1 TABLET ONCE DAILY        * LORazepam 1 MG tablet    ATIVAN     Take 1 mg by mouth        TEMAZEPAM PO      Take 15 mg by mouth At Bedtime        * Notice:  This list has 4 medication(s) that are the same as other medications prescribed for you. Read the directions carefully, and ask your doctor or other care provider to review them with you.

## 2017-09-25 NOTE — PROGRESS NOTES
Attending MD (Dr. Andi Thompson) :  This patient was seen and evaluated by me including a history, exam, and interpretation of all imaging and/or lab data.  A training physician (resident/fellow), who also saw the patient, has documented the clinic visit in the attached note using voice recognition dictation software, as such, transcription errors may be present.    MD Merrill Maddox Family Professor  Oncology and Adult Reconstructive Surgery  Dept Orthopaedic Surgery, [facility]  [phone] office, [phone] pager  [email or web address]

## 2017-09-25 NOTE — LETTER
9/25/2017       RE: Jeffery Palmer  [address]  [address]  [place] [address]     Dear Colleague,    Thank you for referring your patient, Jeffery Palmer, to the [facility] at [facility] Please see a copy of my visit note below.        [facility], Orthopaedic Oncology Surgery Consultation  by Andi Thompson M.D.    Jeffery Palmer MRN# [id number]   Age: 32 year old YOB: 1985     Requesting physician: Carmen Randolph            Assessment and Plan:   Assessment:  1. Chronic infected wounds involving dorsal aspect of bilateral hands. Bedside swab at outside institute consistent with MRSA.  2. Heroin addiction, sober for 1 month    Plan:  1. Scheduled for bilateral dorsal hand biopsies on 9/29/2017 with Dr. Thompson.   2. We discussed the recommendation for smoking cessation, continuing sobriety, as well as to work on nutrition.           History of Present Illness:   32 year old male  Chief complaint: Bilateral dorsal hand wounds    32-year-old year-old right-hand-dominant male who presents at the request of Dr. Payton for evaluation of bilateral dorsal hand wounds. He has had intermittent issues with IV heroin use, and he frequently would inject in the dorsal aspect of his hands. A proximally 1 year ago, he developed wounds related to his injection sites. The wounds have fluctuated in size and severity over time, and they became larger approximate 2 months ago when he began using again. He reports he has been sober for 1 month and he feels that the size of the wounds are now starting to improve. He does not have pain associated with the wounds. He typically covers the wounds with a wrap, but he does not seem to have any specific wound care routine. In regards to prior treatment, he has had no surgery on the wounds. He has been on antibiotics intermittently and most recently has been on clindamycin for 2 weeks. He  "also had a bedside incision and drainage by an emergency department provider of what sounded more like an abscess in the past, and it was positive for MRSA.    Current symptoms:  Problem: Bilateral hand dorsal wounds  Onset and duration: Approximately 1 year  Awakens from sleep due to sx's: No  Precipitating Injury:  Yes, injection of IV drug  Other joints or sites painful: No other joint or wound issues  Fever: No  Appetite change or weight loss: No    Background history:  DX: Bilateral hand dorsal wounds    TREATMENTS:  1. Local wound care, intermittent oral antibiotics, (Daniel Payton), [facility]             Physical Exam:     EXAMINATION pertinent findings:   VITAL SIGNS: Height 1.867 m (6' 1.5\"), weight 91.4 kg (201 lb 6.4 oz).  Body mass index is 26.21 kg/(m^2).  RESP: non labored breathing   ABD: benign   SKIN: grossly normal   LYMPHATIC: grossly normal   NEURO: grossly normal   VASCULAR: satisfactory perfusion of all extremities   MUSCULOSKELETAL:   Right hand:    Left hand:        Able to make a full fist in bilateral hands, full strength with extension of all fingers, extensor tendons gliding without issue. Sensation intact to light touch in the median, radial, ulnar nerve distributions. 2+ radial pulse, hands warm and well-perfused throughout.         Data:   All laboratory data reviewed  All imaging studies reviewed by laura Mccord M.D.  Patient seen and evaluated with Dr. Thompson            DATA for DOCUMENTATION:         Past Medical History:     Patient Active Problem List   Diagnosis     Chemical dependency (H)     ADHD (attention deficit hyperactivity disorder)     Allergic rhinitis     Heroin addiction (H)     Major depression     MRSA (methicillin resistant staph aureus) culture positive     Open wound of hand without foreign body     Tobacco use disorder     Past Medical History:   Diagnosis Date     Anxiety      Depressive disorder        Also see scanned health assessment " forms.       Past Surgical History:   No prior surgeries         Social History:   Patient lives at home with his mother for the past 3 weeks. He reports his been sober from heroin for 1 month. Continues to smoke 1 pack per day. Denies alcohol use. Currently works in real estate.  Social History     Social History     Marital status: Single     Spouse name: N/A     Number of children: N/A     Years of education: N/A     Occupational History     Not on file.     Social History Main Topics     Smoking status: Current Every Day Smoker     Packs/day: 0.50     Years: 4.00     Types: Cigarettes     Start date: 8/8/2013     Smokeless tobacco: Never Used      Comment: 5 cigs per day     Alcohol use Yes     Drug use: Yes     Special: Marijuana      Comment: benzos and heroin     Sexual activity: Yes     Partners: Female     Birth control/ protection: Condom     Other Topics Concern     Not on file     Social History Narrative            Family History:       Family History   Problem Relation Age of Onset     Depression Mother      Anxiety Disorder Mother      Depression Brother      Anxiety Disorder Brother      Substance Abuse Brother      Anxiety Disorder Brother      Substance Abuse Brother      Depression Brother             Medications:     Current Outpatient Prescriptions   Medication Sig     carisoprodol (SOMA) 350 MG tablet 1 tab po qhs prn muscle spasm     clindamycin (CLINDAMAX) 1 % topical gel      LANsoprazole (PREVACID) 30 MG CR capsule One daily     DULoxetine HCl (CYMBALTA PO) Take 60 mg by mouth daily     TEMAZEPAM PO Take 15 mg by mouth At Bedtime      LORAZEPAM 1 MG OR TABS 1 TABLET ONCE DAILY     No current facility-administered medications for this visit.               Review of Systems:   A comprehensive 10 point review of systems (constitutional, ENT, cardiac, peripheral vascular, lymphatic, respiratory, GI, , Musculoskeletal, skin, Neurological) was performed and found to be negative except as  described in this note.     See intake form completed by patient      Answers for HPI/ROS submitted by the patient on 9/25/2017   General Symptoms: No  Skin Symptoms: Yes  HENT Symptoms: No  EYE SYMPTOMS: No  HEART SYMPTOMS: No  LUNG SYMPTOMS: No  INTESTINAL SYMPTOMS: No  URINARY SYMPTOMS: No  REPRODUCTIVE SYMPTOMS: No  SKELETAL SYMPTOMS: No  BLOOD SYMPTOMS: No  NERVOUS SYSTEM SYMPTOMS: No  MENTAL HEALTH SYMPTOMS: Yes  Changes in hair: No  Changes in moles/birth marks: Yes  Itching: No  Rashes: No  Changes in nails: No  Acne: No  Change in facial hair: Yes  Warts: No  Flaking of skin: Yes  Sun sensitivity: Yes        Attending MD (Dr. Andi Thompson) :  This patient was seen and evaluated by me including a history, exam, and interpretation of all imaging and/or lab data.  A training physician (resident/fellow), who also saw the patient, has documented the clinic visit in the attached note using voice recognition dictation software, as such, transcription errors may be present.    MD Merrill Maddox Family Professor  Oncology and Adult Reconstructive Surgery  Dept Orthopaedic Surgery, [facility]  [phone] office, [phone] pager  [email or web address]

## 2017-09-25 NOTE — NURSING NOTE
Teaching Flowsheet   Relevant Diagnosis: Tumor bilateral hand  Teaching Topic: Pre-op  Biopsies bilateral hands     Person(s) involved in teaching:   Patient     Motivation Level:  Asks Questions: Yes  Eager to Learn: Yes  Cooperative: Yes  Receptive (willing/able to accept information): Yes  Any cultural factors/Mosque beliefs that may influence understanding or compliance? No       Patient and Family demonstrates understanding of the following:  Reason for the appointment, diagnosis and treatment plan: Yes  Knowledge of proper use of medications and conditions for which they are ordered (with special attention to potential side effects or drug interactions): Yes  Which situations necessitate calling provider and whom to contact: Yes       Teaching Concerns Addressed:        Proper use and care of  (medical equip, care aids, etc.): NA  Nutritional needs and diet plan: Yes  Pain management techniques: Yes  Wound Care: Yes  How and/when to access community resources: Yes     Instructional Materials Used/Given: Surgery folder     Time spent with patient: 15 minutes.

## 2017-09-25 NOTE — PROGRESS NOTES
[facility], Orthopaedic Oncology Surgery Consultation  by Andi Thompson M.D.    Jeffery Palmer MRN# [id number]   Age: 32 year old YOB: 1985     Requesting physician: Carmen Randolph            Assessment and Plan:   Assessment:  1. Chronic infected wounds involving dorsal aspect of bilateral hands. Bedside swab at outside institute consistent with MRSA.  2. Heroin addiction, sober for 1 month    Plan:  1. Scheduled for bilateral dorsal hand biopsies on 9/29/2017 with Dr. Thompson.   2. We discussed the recommendation for smoking cessation, continuing sobriety, as well as to work on nutrition.           History of Present Illness:   32 year old male  Chief complaint: Bilateral dorsal hand wounds    32-year-old year-old right-hand-dominant male who presents at the request of Dr. Payton for evaluation of bilateral dorsal hand wounds. He has had intermittent issues with IV heroin use, and he frequently would inject in the dorsal aspect of his hands. A proximally 1 year ago, he developed wounds related to his injection sites. The wounds have fluctuated in size and severity over time, and they became larger approximate 2 months ago when he began using again. He reports he has been sober for 1 month and he feels that the size of the wounds are now starting to improve. He does not have pain associated with the wounds. He typically covers the wounds with a wrap, but he does not seem to have any specific wound care routine. In regards to prior treatment, he has had no surgery on the wounds. He has been on antibiotics intermittently and most recently has been on clindamycin for 2 weeks. He also had a bedside incision and drainage by an emergency department provider of what sounded more like an abscess in the past, and it was positive for MRSA.    Current symptoms:  Problem: Bilateral hand dorsal wounds  Onset and duration: Approximately 1 year  Awakens from sleep due to sx's:  "No  Precipitating Injury:  Yes, injection of IV drug  Other joints or sites painful: No other joint or wound issues  Fever: No  Appetite change or weight loss: No    Background history:  DX: Bilateral hand dorsal wounds    TREATMENTS:  1. Local wound care, intermittent oral antibiotics, (Daniel Payton), [facility]             Physical Exam:     EXAMINATION pertinent findings:   VITAL SIGNS: Height 1.867 m (6' 1.5\"), weight 91.4 kg (201 lb 6.4 oz).  Body mass index is 26.21 kg/(m^2).  RESP: non labored breathing   ABD: benign   SKIN: grossly normal   LYMPHATIC: grossly normal   NEURO: grossly normal   VASCULAR: satisfactory perfusion of all extremities   MUSCULOSKELETAL:   Right hand:    Left hand:        Able to make a full fist in bilateral hands, full strength with extension of all fingers, extensor tendons gliding without issue. Sensation intact to light touch in the median, radial, ulnar nerve distributions. 2+ radial pulse, hands warm and well-perfused throughout.         Data:   All laboratory data reviewed  All imaging studies reviewed by laura Mccord M.D.  Patient seen and evaluated with Dr. Thompson            DATA for DOCUMENTATION:         Past Medical History:     Patient Active Problem List   Diagnosis     Chemical dependency (H)     ADHD (attention deficit hyperactivity disorder)     Allergic rhinitis     Heroin addiction (H)     Major depression     MRSA (methicillin resistant staph aureus) culture positive     Open wound of hand without foreign body     Tobacco use disorder     Past Medical History:   Diagnosis Date     Anxiety      Depressive disorder        Also see scanned health assessment forms.       Past Surgical History:   No prior surgeries         Social History:   Patient lives at home with his mother for the past 3 weeks. He reports his been sober from heroin for 1 month. Continues to smoke 1 pack per day. Denies alcohol use. Currently works in real estate.  Social History "     Social History     Marital status: Single     Spouse name: N/A     Number of children: N/A     Years of education: N/A     Occupational History     Not on file.     Social History Main Topics     Smoking status: Current Every Day Smoker     Packs/day: 0.50     Years: 4.00     Types: Cigarettes     Start date: 8/8/2013     Smokeless tobacco: Never Used      Comment: 5 cigs per day     Alcohol use Yes     Drug use: Yes     Special: Marijuana      Comment: benzos and heroin     Sexual activity: Yes     Partners: Female     Birth control/ protection: Condom     Other Topics Concern     Not on file     Social History Narrative            Family History:       Family History   Problem Relation Age of Onset     Depression Mother      Anxiety Disorder Mother      Depression Brother      Anxiety Disorder Brother      Substance Abuse Brother      Anxiety Disorder Brother      Substance Abuse Brother      Depression Brother             Medications:     Current Outpatient Prescriptions   Medication Sig     carisoprodol (SOMA) 350 MG tablet 1 tab po qhs prn muscle spasm     clindamycin (CLINDAMAX) 1 % topical gel      LANsoprazole (PREVACID) 30 MG CR capsule One daily     DULoxetine HCl (CYMBALTA PO) Take 60 mg by mouth daily     TEMAZEPAM PO Take 15 mg by mouth At Bedtime      LORAZEPAM 1 MG OR TABS 1 TABLET ONCE DAILY     No current facility-administered medications for this visit.               Review of Systems:   A comprehensive 10 point review of systems (constitutional, ENT, cardiac, peripheral vascular, lymphatic, respiratory, GI, , Musculoskeletal, skin, Neurological) was performed and found to be negative except as described in this note.     See intake form completed by patient      Answers for HPI/ROS submitted by the patient on 9/25/2017   General Symptoms: No  Skin Symptoms: Yes  HENT Symptoms: No  EYE SYMPTOMS: No  HEART SYMPTOMS: No  LUNG SYMPTOMS: No  INTESTINAL SYMPTOMS: No  URINARY SYMPTOMS:  No  REPRODUCTIVE SYMPTOMS: No  SKELETAL SYMPTOMS: No  BLOOD SYMPTOMS: No  NERVOUS SYSTEM SYMPTOMS: No  MENTAL HEALTH SYMPTOMS: Yes  Changes in hair: No  Changes in moles/birth marks: Yes  Itching: No  Rashes: No  Changes in nails: No  Acne: No  Change in facial hair: Yes  Warts: No  Flaking of skin: Yes  Sun sensitivity: Yes

## 2017-09-28 ENCOUNTER — ANESTHESIA EVENT (OUTPATIENT)
Dept: SURGERY | Facility: CLINIC | Age: 32
End: 2017-09-28
Payer: COMMERCIAL

## 2017-09-28 NOTE — ANESTHESIA PREPROCEDURE EVALUATION
Anesthesia Evaluation     .             ROS/MED HX    ENT/Pulmonary:  - neg pulmonary ROS     Neurologic:  - neg neurologic ROS     Cardiovascular:  - neg cardiovascular ROS       METS/Exercise Tolerance:     Hematologic:  - neg hematologic  ROS       Musculoskeletal:  - neg musculoskeletal ROS       GI/Hepatic:  - neg GI/hepatic ROS       Renal/Genitourinary:  - ROS Renal section negative       Endo:  - neg endo ROS       Psychiatric:     (+) psychiatric history anxiety and depression (hx of heroin abuse)      Infectious Disease:   (+) MRSA,       Malignancy:         Other:                                    Anesthesia Plan      History & Physical Review  History and physical reviewed and following examination; no interval change.    ASA Status:  2 .        Plan for General and LMA with Intravenous induction. Maintenance will be Balanced.    PONV prophylaxis:  Ondansetron (or other 5HT-3)       Postoperative Care  Postoperative pain management:  Post procedure pain management: Ketamine, minimal opioid..      Consents                          .

## 2017-09-29 ENCOUNTER — HOSPITAL ENCOUNTER (OUTPATIENT)
Facility: CLINIC | Age: 32
Discharge: HOME | End: 2017-09-29
Attending: ORTHOPAEDIC SURGERY | Admitting: ORTHOPAEDIC SURGERY
Payer: COMMERCIAL

## 2017-09-29 ENCOUNTER — TELEPHONE (OUTPATIENT)
Dept: ORTHOPEDICS | Facility: CLINIC | Age: 32
End: 2017-09-29

## 2017-09-29 ENCOUNTER — ANESTHESIA (OUTPATIENT)
Dept: SURGERY | Facility: CLINIC | Age: 32
End: 2017-09-29
Payer: COMMERCIAL

## 2017-09-29 VITALS
BODY MASS INDEX: 25.29 KG/M2 | TEMPERATURE: 97.9 F | SYSTOLIC BLOOD PRESSURE: 125 MMHG | WEIGHT: 197.09 LBS | HEIGHT: 74 IN | DIASTOLIC BLOOD PRESSURE: 74 MMHG | OXYGEN SATURATION: 100 % | RESPIRATION RATE: 16 BRPM

## 2017-09-29 PROCEDURE — 71000014 ZZH RECOVERY PHASE 1 LEVEL 2 FIRST HR: Performed by: ORTHOPAEDIC SURGERY

## 2017-09-29 PROCEDURE — 25000128 H RX IP 250 OP 636: Performed by: ANESTHESIOLOGY

## 2017-09-29 PROCEDURE — 40000170 ZZH STATISTIC PRE-PROCEDURE ASSESSMENT II: Performed by: ORTHOPAEDIC SURGERY

## 2017-09-29 PROCEDURE — 37000009 ZZH ANESTHESIA TECHNICAL FEE, EACH ADDTL 15 MIN: Performed by: ORTHOPAEDIC SURGERY

## 2017-09-29 PROCEDURE — 25000125 ZZHC RX 250: Performed by: NURSE ANESTHETIST, CERTIFIED REGISTERED

## 2017-09-29 PROCEDURE — 88305 TISSUE EXAM BY PATHOLOGIST: CPT | Mod: 26,59 | Performed by: ORTHOPAEDIC SURGERY

## 2017-09-29 PROCEDURE — 88305 TISSUE EXAM BY PATHOLOGIST: CPT | Performed by: ORTHOPAEDIC SURGERY

## 2017-09-29 PROCEDURE — 25000132 ZZH RX MED GY IP 250 OP 250 PS 637: Performed by: ANESTHESIOLOGY

## 2017-09-29 PROCEDURE — 25000128 H RX IP 250 OP 636: Performed by: NURSE ANESTHETIST, CERTIFIED REGISTERED

## 2017-09-29 PROCEDURE — 37000008 ZZH ANESTHESIA TECHNICAL FEE, 1ST 30 MIN: Performed by: ORTHOPAEDIC SURGERY

## 2017-09-29 PROCEDURE — 36000053 ZZH SURGERY LEVEL 2 EA 15 ADDTL MIN - UMMC: Performed by: ORTHOPAEDIC SURGERY

## 2017-09-29 PROCEDURE — 71000027 ZZH RECOVERY PHASE 2 EACH 15 MINS: Performed by: ORTHOPAEDIC SURGERY

## 2017-09-29 PROCEDURE — 27210995 ZZH RX 272: Performed by: ORTHOPAEDIC SURGERY

## 2017-09-29 PROCEDURE — 36000051 ZZH SURGERY LEVEL 2 1ST 30 MIN - UMMC: Performed by: ORTHOPAEDIC SURGERY

## 2017-09-29 PROCEDURE — 27210794 ZZH OR GENERAL SUPPLY STERILE: Performed by: ORTHOPAEDIC SURGERY

## 2017-09-29 RX ORDER — LORAZEPAM 1 MG/1
1 TABLET ORAL
COMMUNITY
Start: 2017-08-02 | End: 2020-12-30

## 2017-09-29 RX ORDER — FENTANYL CITRATE 50 UG/ML
25-50 INJECTION, SOLUTION INTRAMUSCULAR; INTRAVENOUS
Status: DISCONTINUED | OUTPATIENT
Start: 2017-09-29 | End: 2017-09-29 | Stop reason: HOSPADM

## 2017-09-29 RX ORDER — DULOXETIN HYDROCHLORIDE 30 MG/1
30 CAPSULE, DELAYED RELEASE ORAL
COMMUNITY
Start: 2017-01-09 | End: 2020-12-30

## 2017-09-29 RX ORDER — ONDANSETRON 2 MG/ML
4 INJECTION INTRAMUSCULAR; INTRAVENOUS EVERY 30 MIN PRN
Status: DISCONTINUED | OUTPATIENT
Start: 2017-09-29 | End: 2017-09-29 | Stop reason: HOSPADM

## 2017-09-29 RX ORDER — ACETAMINOPHEN 325 MG/1
975 TABLET ORAL ONCE
Status: COMPLETED | OUTPATIENT
Start: 2017-09-29 | End: 2017-09-29

## 2017-09-29 RX ORDER — GABAPENTIN 100 MG/1
300 CAPSULE ORAL ONCE
Status: COMPLETED | OUTPATIENT
Start: 2017-09-29 | End: 2017-09-29

## 2017-09-29 RX ORDER — MEPERIDINE HYDROCHLORIDE 25 MG/ML
12.5 INJECTION INTRAMUSCULAR; INTRAVENOUS; SUBCUTANEOUS
Status: DISCONTINUED | OUTPATIENT
Start: 2017-09-29 | End: 2017-09-29 | Stop reason: HOSPADM

## 2017-09-29 RX ORDER — SODIUM CHLORIDE, SODIUM LACTATE, POTASSIUM CHLORIDE, CALCIUM CHLORIDE 600; 310; 30; 20 MG/100ML; MG/100ML; MG/100ML; MG/100ML
INJECTION, SOLUTION INTRAVENOUS CONTINUOUS PRN
Status: DISCONTINUED | OUTPATIENT
Start: 2017-09-29 | End: 2017-09-29

## 2017-09-29 RX ORDER — NALOXONE HYDROCHLORIDE 0.4 MG/ML
.1-.4 INJECTION, SOLUTION INTRAMUSCULAR; INTRAVENOUS; SUBCUTANEOUS
Status: DISCONTINUED | OUTPATIENT
Start: 2017-09-29 | End: 2017-09-29 | Stop reason: HOSPADM

## 2017-09-29 RX ORDER — KETAMINE HYDROCHLORIDE 10 MG/ML
INJECTION, SOLUTION INTRAMUSCULAR; INTRAVENOUS PRN
Status: DISCONTINUED | OUTPATIENT
Start: 2017-09-29 | End: 2017-09-29

## 2017-09-29 RX ORDER — KETOROLAC TROMETHAMINE 30 MG/ML
INJECTION, SOLUTION INTRAMUSCULAR; INTRAVENOUS PRN
Status: DISCONTINUED | OUTPATIENT
Start: 2017-09-29 | End: 2017-09-29

## 2017-09-29 RX ORDER — SODIUM CHLORIDE, SODIUM LACTATE, POTASSIUM CHLORIDE, CALCIUM CHLORIDE 600; 310; 30; 20 MG/100ML; MG/100ML; MG/100ML; MG/100ML
INJECTION, SOLUTION INTRAVENOUS CONTINUOUS
Status: DISCONTINUED | OUTPATIENT
Start: 2017-09-29 | End: 2017-09-29 | Stop reason: HOSPADM

## 2017-09-29 RX ORDER — LIDOCAINE HYDROCHLORIDE 20 MG/ML
INJECTION, SOLUTION INFILTRATION; PERINEURAL PRN
Status: DISCONTINUED | OUTPATIENT
Start: 2017-09-29 | End: 2017-09-29

## 2017-09-29 RX ORDER — FENTANYL CITRATE 50 UG/ML
INJECTION, SOLUTION INTRAMUSCULAR; INTRAVENOUS PRN
Status: DISCONTINUED | OUTPATIENT
Start: 2017-09-29 | End: 2017-09-29

## 2017-09-29 RX ORDER — ONDANSETRON 4 MG/1
4 TABLET, ORALLY DISINTEGRATING ORAL EVERY 30 MIN PRN
Status: DISCONTINUED | OUTPATIENT
Start: 2017-09-29 | End: 2017-09-29 | Stop reason: HOSPADM

## 2017-09-29 RX ORDER — PROPOFOL 10 MG/ML
INJECTION, EMULSION INTRAVENOUS PRN
Status: DISCONTINUED | OUTPATIENT
Start: 2017-09-29 | End: 2017-09-29

## 2017-09-29 RX ORDER — MAGNESIUM HYDROXIDE 1200 MG/15ML
LIQUID ORAL PRN
Status: DISCONTINUED | OUTPATIENT
Start: 2017-09-29 | End: 2017-09-29 | Stop reason: HOSPADM

## 2017-09-29 RX ORDER — ACETAMINOPHEN 325 MG/1
650 TABLET ORAL
Status: DISCONTINUED | OUTPATIENT
Start: 2017-09-29 | End: 2017-09-29 | Stop reason: HOSPADM

## 2017-09-29 RX ORDER — ONDANSETRON 2 MG/ML
INJECTION INTRAMUSCULAR; INTRAVENOUS PRN
Status: DISCONTINUED | OUTPATIENT
Start: 2017-09-29 | End: 2017-09-29

## 2017-09-29 RX ADMIN — KETOROLAC TROMETHAMINE 30 MG: 30 INJECTION, SOLUTION INTRAMUSCULAR at 09:27

## 2017-09-29 RX ADMIN — FENTANYL CITRATE 25 MCG: 50 INJECTION, SOLUTION INTRAMUSCULAR; INTRAVENOUS at 10:14

## 2017-09-29 RX ADMIN — ACETAMINOPHEN 975 MG: 325 TABLET, FILM COATED ORAL at 07:11

## 2017-09-29 RX ADMIN — GABAPENTIN 300 MG: 300 CAPSULE ORAL at 07:11

## 2017-09-29 RX ADMIN — KETAMINE HCL-NACL SOLN PREF SY 50 MG/5ML-0.9% (10MG/ML) 30 MG: 10 SOLUTION PREFILLED SYRINGE at 08:47

## 2017-09-29 RX ADMIN — SODIUM CHLORIDE, POTASSIUM CHLORIDE, SODIUM LACTATE AND CALCIUM CHLORIDE: 600; 310; 30; 20 INJECTION, SOLUTION INTRAVENOUS at 08:29

## 2017-09-29 RX ADMIN — FENTANYL CITRATE 25 MCG: 50 INJECTION, SOLUTION INTRAMUSCULAR; INTRAVENOUS at 10:24

## 2017-09-29 RX ADMIN — ONDANSETRON 4 MG: 2 INJECTION INTRAMUSCULAR; INTRAVENOUS at 09:27

## 2017-09-29 RX ADMIN — LIDOCAINE HYDROCHLORIDE 100 MG: 20 INJECTION, SOLUTION INFILTRATION; PERINEURAL at 08:47

## 2017-09-29 RX ADMIN — PROPOFOL 200 MG: 10 INJECTION, EMULSION INTRAVENOUS at 08:47

## 2017-09-29 RX ADMIN — MIDAZOLAM HYDROCHLORIDE 2 MG: 1 INJECTION, SOLUTION INTRAMUSCULAR; INTRAVENOUS at 08:29

## 2017-09-29 RX ADMIN — FENTANYL CITRATE 25 MCG: 50 INJECTION, SOLUTION INTRAMUSCULAR; INTRAVENOUS at 08:46

## 2017-09-29 NOTE — ANESTHESIA CARE TRANSFER NOTE
Patient: Jeffery MONTGOMERY Spencer    Procedure(s):  Bilateral Biopsy Of Hands  - Wound Class: I-Clean    Diagnosis: Lesions Bilateral Hands  Diagnosis Additional Information: No value filed.    Anesthesia Type:   General, ETT     Note:  Airway :Face Mask  Patient transferred to:PACU  Comments:   -on 6L O2 via FM, Pt Spont.  breathing, awake & alert, monitors placed, VSS, RN at bedside, no airway      Vitals: (Last set prior to Anesthesia Care Transfer)    CRNA VITALS  9/29/2017 0913 - 9/29/2017 0949      9/29/2017             Resp Rate (observed): 30                Electronically Signed By: ROHINI Bearden CRNA  September 29, 2017  9:49 AM

## 2017-09-29 NOTE — TELEPHONE ENCOUNTER
"    [facility]:  Nursing Note  SITUATION                                                      Jeffery Palmer is a 32 year old male who calls with questions.  \"How soon should I change the dressing on my hand?  Do I call in a week or make an appointment?\"    BACKGROUND                                                      Patient is s/p Bilateral Biopsy Of Hands  - Wound Class: I-Clean on Sept 29.    Date of last clinic appointment: on Sept 29th for bilateral hand biopsies, with Dr. Thompson    Does patient have a future appointment scheduled?  Yes -  next appointment is on: Oct 5th    ASSESSMENT      wound care concern - change dressings daily, starting tomorrow.  Return to clinic in one week, Oct 5th, to discuss biopsy results.    PLAN                                                      Nursing Interventions: Encounter routed to ARLETTE Alonzo cc with Dr Thompson as FYI.  RECOMMENDED DISPOSITION: To be seen in clinic - appointment on Oct 5th  Will comply with recommendation: Yes    Patient/family can be reached at: N/A    If further questions/concerns or if symptoms do not improve, worsen or new symptoms develop, patient/family are advised to call [phone], option #3 to speak with a triage nurse, as soon as possible.    Devi Mims    "

## 2017-09-29 NOTE — IP AVS SNAPSHOT
[facility]    [address] [facility] [facility] [address]    Phone:  [phone]                                       After Visit Summary   9/29/2017    Jeffery Palmer    MRN: [id number]           After Visit Summary Signature Page     I have received my discharge instructions, and my questions have been answered. I have discussed any challenges I see with this plan with the nurse or doctor.    ..........................................................................................................................................  Patient/Patient Representative Signature      ..........................................................................................................................................  Patient Representative Print Name and Relationship to Patient    ..................................................               ................................................  Date                                            Time    ..........................................................................................................................................  Reviewed by Signature/Title    ...................................................              ..............................................  Date                                                            Time

## 2017-09-29 NOTE — BRIEF OP NOTE
Orthopaedic Surgery Brief Op-Note      Patient: Jeffery Palmer  : 1985  Date of Service: 2017 9:02 AM    Pre-operative Diagnosis: Lesions Bilateral Hands  Post-operative Diagnosis: same    Procedure(s) Performed: Procedure(s):  Bilateral Biopsy Of Hands  - Wound Class: I-Clean    Staff: Andi Thompson MD  Assistants:   Gabo Mayfield MD    Anesthesia: General  EBL: 10 cc  UOP: see anesthesia record      Implants:   none       Drains: none  Intra-op Labs/Cxs/Specimens: left and right hand biopsies  Complications: No apparent complications during procedure  Findings: Please see dictated operative note for details    Disposition: Home today from pacu        Assessment/Plan:   Jeffery Palmer is a 32 year old male s/p Procedure(s):  Bilateral Biopsy Of Hands  - Wound Class: I-Clean on 2017 with Dr. Thompson.    Activity: Up with assist.  Weight bearing status: AT   Antibiotics: none required  Diet: Begin with clear fluids and progress diet as tolerated.      DVT prophylaxis: none  Dressing: cont q day dressing changes  Bracing/Splinting: none  Pain management: avoidance of narcotics, patient with history of dependence  Physical Therapy/Occupational Therapy  Follow-up: Clinic with Dr. Thompson in 1 weeks   no x-rays needed.    Signed:   Gabo Mayfield MD  Adult Reconstructive Surgery Fellow  Dept Orthopaedic Surgery, [facility]

## 2017-09-29 NOTE — IP AVS SNAPSHOT
MRN:[id number]                      After Visit Summary   9/29/2017    Jeffery Palmer    MRN: [id number]           Thank you!     Thank you for choosing [facility] for your care. Our goal is always to provide you with excellent care. Hearing back from our patients is one way we can continue to improve our services. Please take a few minutes to complete the written survey that you may receive in the mail after you visit with us. Thank you!        Patient Information     Date Of Birth          1985        About your hospital stay     You were admitted on:  September 29, 2017 You last received care in the:   PACU    You were discharged on:  September 29, 2017       Who to Call     For medical emergencies, please call 911.  For non-urgent questions about your medical care, please call your primary care provider or clinic, [phone]  For questions related to your surgery, please call your surgery clinic        Attending Provider     Provider Specialty    Andi Thompson MD Orthopedics       Primary Care Provider Office Phone # Fax #    Carmen Kennedy [phone]      After Care Instructions     Discharge Instructions       Review outpatient procedure discharge instructions with patient as directed by Provider            Dressing Change        Cont q daily dressing changes                  Further instructions from your care team       Same-Day Surgery   Adult Discharge Orders & Instructions     For 24 hours after surgery:  1. Get plenty of rest.  A responsible adult must stay with you for at least 24 hours after you leave the hospital.   2. Pain medication can slow your reflexes. Do not drive or use heavy equipment.  If you have weakness or tingling, don't drive or use heavy equipment until this feeling goes away.  3. Mixing alcohol and pain medication can cause dizziness and slow your breathing. It can even be fatal. Do not drink alcohol while taking pain  medication.  4. Avoid strenuous or risky activities.  Ask for help when climbing stairs.   5. You may feel lightheaded.  If so, sit for a few minutes before standing.  Have someone help you get up.   6. If you have nausea (feel sick to your stomach), drink only clear liquids such as apple juice, ginger ale, broth or 7-Up.  Rest may also help.  Be sure to drink enough fluids.  Move to a regular diet as you feel able. Take pain medications with a small amount of solid food, such as toast or crackers, to avoid nausea.   7. A slight fever is normal. Call the doctor if your fever is over 100 F (37.7 C) (taken under the tongue) or lasts longer than 24 hours.  8. You may have a dry mouth, muscle aches, trouble sleeping or a sore throat.  These symptoms should go away after 24 hours.  9. Do not make important or legal decisions.   Pain Management:      1. Take pain medication (if prescribed) for pain as directed by your physician.        2. WARNING: If the pain medication you have been prescribed contains Tylenol  (acetaminophen), DO NOT take additional doses of Tylenol (acetaminophen).     Call your doctor for any of the followin.  Signs of infection (fever, growing tenderness at the surgery site, severe pain, a large amount of drainage or bleeding, foul-smelling drainage, redness, swelling).    2.  It has been over 8 to 10 hours since surgery and you are still not able to urinate (pee).    3.  Headache for over 24 hours.    4.  Numbness, tingling or weakness the day after surgery (if you had spinal anesthesia).  To contact a doctor, call __Dr. Thompson___ or:      [phone] and ask for the Resident On Call for:          ___Ortho___ (answered 24 hours a day)      Emergency Department:  [facility]: [phone]  [facility]: [phone]               Rev. 10/2014     Follow up with Dr. Thompson in ONE week.          Pending Results     Date and Time Order Name Status Description     "2017 0909 Surgical pathology exam In process             Admission Information     Date & Time Provider Department Dept. Phone    2017 Andi Thompson MD [facility] PACU [phone]      Your Vitals Were     Blood Pressure Temperature Respirations Height Weight Pulse Oximetry    133/76 97.3  F (36.3  C) (Oral) 12 1.88 m (6' 2\") 89.4 kg (197 lb 1.5 oz) 100%    BMI (Body Mass Index)                   25.31 kg/m2           [employer] Information     [employer] lets you send messages to your doctor, view your test results, renew your prescriptions, schedule appointments and more. To sign up, go to [email or web address] . Click on \"Log in\" on the left side of the screen, which will take you to the Welcome page. Then click on \"Sign up Now\" on the right side of the page.     You will be asked to enter the access code listed below, as well as some personal information. Please follow the directions to create your username and password.     Your access code is: [email or web address]  Expires: 2017  6:30 AM     Your access code will  in 90 days. If you need help or a new code, please call your [facility] clinic or [phone].        Care EveryWhere ID     This is your Care EveryWhere ID. This could be used by other organizations to access your [facility] medical records  [id number]        Equal Access to Services     JUNIOR ODOM AH: Hadii ludin hammer hadasho Soinaali, waaxda luqadaha, qaybta kaalmada adeegyada, sonal barrera . So [facility] [phone].    ATENCIÓN: Si habla español, tiene a cardona disposición servicios gratuitos de asistencia lingüística. Llame al [phone].    We comply with applicable federal civil rights laws and [place] laws. We do not discriminate on the basis of race, color, national origin, age, disability sex, sexual orientation or gender identity.               Review of your medicines      CONTINUE these medicines which have NOT CHANGED        Dose / Directions    carisoprodol " 350 MG tablet   Commonly known as:  SOMA        1 tab po qhs prn muscle spasm   Refills:  0       clindamycin 1 % topical gel   Commonly known as:  CLINDAMAX        Refills:  0       * CYMBALTA PO        Dose:  60 mg   Take 60 mg by mouth daily   Refills:  0       * DULoxetine 30 MG EC capsule   Commonly known as:  CYMBALTA        Dose:  30 mg   Take 30 mg by mouth   Refills:  0       LANsoprazole 30 MG CR capsule   Commonly known as:  PREVACID        One daily   Refills:  0       * LORazepam 1 MG tablet   Commonly known as:  ATIVAN        1 TABLET ONCE DAILY   Refills:  0       * LORazepam 1 MG tablet   Commonly known as:  ATIVAN        Dose:  1 mg   Take 1 mg by mouth   Refills:  0       TEMAZEPAM PO        Dose:  15 mg   Take 15 mg by mouth At Bedtime   Refills:  0       * Notice:  This list has 4 medication(s) that are the same as other medications prescribed for you. Read the directions carefully, and ask your doctor or other care provider to review them with you.             Protect others around you: Learn how to safely use, store and throw away your medicines at www.disposemymeds.org.             Medication List: This is a list of all your medications and when to take them. Check marks below indicate your daily home schedule. Keep this list as a reference.      Medications           Morning Afternoon Evening Bedtime As Needed    carisoprodol 350 MG tablet   Commonly known as:  SOMA   1 tab po qhs prn muscle spasm                                clindamycin 1 % topical gel   Commonly known as:  CLINDAMAX                                * CYMBALTA PO   Take 60 mg by mouth daily                                * DULoxetine 30 MG EC capsule   Commonly known as:  CYMBALTA   Take 30 mg by mouth                                LANsoprazole 30 MG CR capsule   Commonly known as:  PREVACID   One daily                                * LORazepam 1 MG tablet   Commonly known as:  ATIVAN   1 TABLET ONCE DAILY                                 * LORazepam 1 MG tablet   Commonly known as:  ATIVAN   Take 1 mg by mouth                                TEMAZEPAM PO   Take 15 mg by mouth At Bedtime                                * Notice:  This list has 4 medication(s) that are the same as other medications prescribed for you. Read the directions carefully, and ask your doctor or other care provider to review them with you.

## 2017-09-29 NOTE — ANESTHESIA POSTPROCEDURE EVALUATION
Patient: Jeffery MONTGOMERY Spencer    Procedure(s):  Bilateral Biopsy Of Hands and debridment bilateral hands - Wound Class: I-Clean    Diagnosis:Lesions Bilateral Hands  Diagnosis Additional Information: No value filed.    Anesthesia Type:  General, ETT    Note:  Anesthesia Post Evaluation    Patient location during evaluation: PACU  Patient participation: Able to fully participate in evaluation  Level of consciousness: awake and alert  Pain management: adequate  Airway patency: patent  Cardiovascular status: acceptable  Respiratory status: acceptable  Hydration status: acceptable  PONV: none     Anesthetic complications: None          Last vitals:  Vitals:    09/29/17 1000 09/29/17 1015 09/29/17 1030   BP: 136/75 139/76 133/76   Resp: 10 12 12   Temp:  36.3  C (97.3  F)    SpO2: 96% 100% 100%         Electronically Signed By: Lisa Hurtado MD  September 29, 2017  11:06 AM

## 2017-09-29 NOTE — DISCHARGE INSTRUCTIONS
Same-Day Surgery   Adult Discharge Orders & Instructions     For 24 hours after surgery:  1. Get plenty of rest.  A responsible adult must stay with you for at least 24 hours after you leave the hospital.   2. Pain medication can slow your reflexes. Do not drive or use heavy equipment.  If you have weakness or tingling, don't drive or use heavy equipment until this feeling goes away.  3. Mixing alcohol and pain medication can cause dizziness and slow your breathing. It can even be fatal. Do not drink alcohol while taking pain medication.  4. Avoid strenuous or risky activities.  Ask for help when climbing stairs.   5. You may feel lightheaded.  If so, sit for a few minutes before standing.  Have someone help you get up.   6. If you have nausea (feel sick to your stomach), drink only clear liquids such as apple juice, ginger ale, broth or 7-Up.  Rest may also help.  Be sure to drink enough fluids.  Move to a regular diet as you feel able. Take pain medications with a small amount of solid food, such as toast or crackers, to avoid nausea.   7. A slight fever is normal. Call the doctor if your fever is over 100 F (37.7 C) (taken under the tongue) or lasts longer than 24 hours.  8. You may have a dry mouth, muscle aches, trouble sleeping or a sore throat.  These symptoms should go away after 24 hours.  9. Do not make important or legal decisions.   Pain Management:      1. Take pain medication (if prescribed) for pain as directed by your physician.        2. WARNING: If the pain medication you have been prescribed contains Tylenol  (acetaminophen), DO NOT take additional doses of Tylenol (acetaminophen).     Call your doctor for any of the followin.  Signs of infection (fever, growing tenderness at the surgery site, severe pain, a large amount of drainage or bleeding, foul-smelling drainage, redness, swelling).    2.  It has been over 8 to 10 hours since surgery and you are still not able to urinate (pee).    3.   Headache for over 24 hours.    4.  Numbness, tingling or weakness the day after surgery (if you had spinal anesthesia).  To contact a doctor, call __Dr. Thompson___ or:      [phone] and ask for the Resident On Call for:          ___Ortho___ (answered 24 hours a day)      Emergency Department:  [facility]: [phone]  [place]: [phone]               Rev. 10/2014     Follow up with Dr. Thompson in ONE week.

## 2017-10-03 NOTE — PROGRESS NOTES
Cheri,  Pls notify pt that there is no evidence of cancer present.  I'd like to refer him to the new plastic surgeon , dr cormier, who also has hand surgery training too.      Thanks      Andi Thompson MD  10/3/2017  8:30 AM

## 2017-11-16 NOTE — OP NOTE
DATE OF SURGERY:  09/29/2017.      SURGEON:  Andi Thompson MD      ASSISTANT:  Gabo Mayfield MD      PREOPERATIVE DIAGNOSIS:  Bilateral chronic ulceration and open wounds, dorsum of both hands.      POSTOPERATIVE DIAGNOSIS:  Bilateral chronic ulceration and open wounds, dorsum of both hands.      PROCEDURES PERFORMED:   1.  Debridement of skin and subcutaneous tissue, right hand dorsum.   2.  Open biopsy of right hand dorsum.   3.  Debridement of skin and subcutaneous tissue, sharp excisional of left hand dorsum.   4.  Open biopsy of left hand dorsum.      INDICATIONS:  Jeffery Palmer is a 32-year-old male who has a longstanding history of drug abuse and has had problems with desquamation and open sores present along the dorsal aspect of both hands.  He was managed for a prolonged period of time with gauze wet-to-dry dressing changes.  He now presents for debridement of the hand as well as biopsy of several areas, which are concerning for possible squamous cell carcinoma.      DESCRIPTION OF PROCEDURE:  The patient was placed on the operating table in supine position after induction of general anesthesia.  The right upper extremity and the left upper extremity were both prepped and draped in sterile manner.  The right side was addressed first, where I debrided the skin and subcutaneous tissues sharply with a knife.  All necrotic tissue was removed from the area and debrided down to bleeding tissues.      Now I addressed the areas that were of concern for possible Marjolin's ulcer with possible differentiation into squamous cell carcinoma.  These areas were excised and sent in formalin for pathologic analysis.  I then secured hemostasis.  Saline gauze dressings were now placed in the dorsum of the right hand and a sterile dressing was applied.      The left side was now addressed.  Separate instruments were utilized.      I then sharply debrided the left hand dorsum with scalpel.  Necrotic tissue was removed until  bleeding tissue was encountered.      In a similar manner, I also performed an open biopsy of areas that were of concern for possible degeneration into squamous cell carcinoma.  Each of these areas was then sampled and placed in formalin for the pathologist.  I then placed sterile saline gauze dressings on the left hand as well as a compression wrap.      POSTOPERATIVE PLAN:  Will be for the patient to be discharged.  He will maintain elevation of the limb and we will contact him with results of his above-mentioned procedures.         DANIELA RAMIREZ MD             D: 2017 00:29   T: 2017 04:32   MT: [email or web address]      Name:     KAELYN VELASCO   MRN:      [id number]        Account:        [id number]   :      1985           Procedure Date: 2017      Document: [id number]

## 2017-11-22 LAB — COPATH REPORT: NORMAL

## 2017-12-21 ENCOUNTER — OFFICE VISIT (OUTPATIENT)
Dept: ORTHOPEDICS | Facility: CLINIC | Age: 32
End: 2017-12-21
Payer: COMMERCIAL

## 2017-12-21 VITALS — HEIGHT: 73 IN | BODY MASS INDEX: 24.52 KG/M2 | WEIGHT: 185 LBS

## 2017-12-21 DIAGNOSIS — T14.8XXA OPEN WOUND: Primary | ICD-10-CM

## 2017-12-21 RX ORDER — GAUZE BANDAGE 4" X 4"
1 SPONGE TOPICAL 2 TIMES DAILY
Qty: 30 EACH | Refills: 1 | Status: SHIPPED | OUTPATIENT
Start: 2017-12-21 | End: 2020-12-30

## 2017-12-21 RX ORDER — GAUZE BANDAGE 3.4"X129"
1 BANDAGE TOPICAL 2 TIMES DAILY
Qty: 30 EACH | Refills: 1 | Status: SHIPPED | OUTPATIENT
Start: 2017-12-21 | End: 2020-12-30

## 2017-12-21 ASSESSMENT — ENCOUNTER SYMPTOMS
NAIL CHANGES: 0
HALLUCINATIONS: 0
WEIGHT GAIN: 0
NIGHT SWEATS: 0
NERVOUS/ANXIOUS: 1
POOR WOUND HEALING: 1
FATIGUE: 0
INCREASED ENERGY: 0
ALTERED TEMPERATURE REGULATION: 0
CHILLS: 0
SKIN CHANGES: 0
DECREASED CONCENTRATION: 1
DEPRESSION: 1
FEVER: 0
DECREASED APPETITE: 0
PANIC: 0
POLYPHAGIA: 0
INSOMNIA: 1
WEIGHT LOSS: 0
POLYDIPSIA: 0

## 2017-12-21 NOTE — LETTER
12/21/2017       RE: Jeffery Palmer  [address]  [facility] [address]     Dear Colleague,    Thank you for referring your patient, Jeffery Palmer, to the [facility] at [facility]. Please see a copy of my visit note below.    REFERRING PROVIDER: Carmen Kennedy    REASON FOR CONSULTATION: Bilateral chronic dorsal hand wounds.    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  He was referred to our clinic for evaluation and management of these wounds.  Patient reports that the wounds have slowly enlarged and plateaued in size recently.  He disclosed to me his history of heroin drug addiction and frequent injection of the drug into his dorsal hand veins.  He has been diagnosed with MRSA infection in these wounds.  He has also undergone biopsy of the wounds to rule out squamous cell carcinoma.  Biopsy results did not show any sign of malignancy.  He has been performing daily to twice daily dressing changes with a nonadherent dressing.      MEDS:   Prior to Admission medications    Medication Sig Start Date End Date Taking? Authorizing Provider   DULoxetine (CYMBALTA) 30 MG EC capsule Take 30 mg by mouth 1/9/17   Reported, Patient   LORazepam (ATIVAN) 1 MG tablet Take 1 mg by mouth 8/2/17   Reported, Patient   carisoprodol (SOMA) 350 MG tablet 1 tab po qhs prn muscle spasm 8/11/17   Reported, Patient   clindamycin (CLINDAMAX) 1 % topical gel  5/29/17   Reported, Patient   LANsoprazole (PREVACID) 30 MG CR capsule One daily 4/17/17   Reported, Patient   DULoxetine HCl (CYMBALTA PO) Take 60 mg by mouth daily    Reported, Patient   TEMAZEPAM PO Take 15 mg by mouth At Bedtime     Reported, Patient   LORAZEPAM 1 MG OR TABS 1 TABLET ONCE DAILY    Reported, Patient       ALLERGIES:   Allergies   Allergen Reactions     Amoxicillin      Other reaction(s):  "*Unknown - Childhood Rxn     Penicillin [Esters]      Wellbutrin [Bupropion Hydrobromide]      Night terrors       PMH:   Past Medical History:   Diagnosis Date     Anxiety      Depressive disorder      MRSA infection        PSH:   Past Surgical History:   Procedure Laterality Date     BIOPSY BONE HAND Bilateral 9/29/2017    Procedure: BIOPSY BONE HAND;  Bilateral Biopsy Of Hands and debridment bilateral hands;  Surgeon: Andi Thompson MD;  Location: [facility] OR       SH:   Social History   Substance Use Topics     Smoking status: Current Every Day Smoker     Packs/day: 1.00     Years: 4.00     Types: Cigarettes     Start date: 8/8/2013     Smokeless tobacco: Never Used     Alcohol use No   Patient reports that he smokes half pack a day and has been doing so for a long time.  He is very interested in quitting smoking.    FH:   Family History   Problem Relation Age of Onset     Depression Mother      Anxiety Disorder Mother      Depression Brother      Anxiety Disorder Brother      Substance Abuse Brother      Anxiety Disorder Brother      Substance Abuse Brother      Depression Brother        ROS: Denies chest pain, shortness of breath, MI, CVA, diabetes, DVT, PE, and bleeding disorders.    PHYSICAL EXAMINATION: Ht 1.861 m (6' 1.27\")  Wt 83.9 kg (185 lb)  BMI 24.23 kg/m2  General: In no acute distress.  Patient smells of heavy tobacco smoke.  On exam of his left hand, there is a 7.5 x 7.5 cm wound over the dorsal hand with exposed subcutaneous tissue and raised skin edges on the borders of the wound.  There is no purulence, and no exposure of deeper structures.  There are multiple scattered scabs and dry necrotic tissue.  He is able to make a composite fist with his left hand and extend all digits.  Sensation to light touch is intact on the dorsum of the thumb and all fingers.  Radial artery pulse is palpable at the volar wrist.  On examination of the patient's right hand, there is a 7 x 5.5 cm wound over the dorsal " hand with exposed subcutaneous tissue and a 1 cm exposure of an extensor tendon, likely extensor digitorum to the index finger or extensor indices proprius.  The skin edges along the borders of the wound are raised.  There is no purulence and no other exposure of any deeper structure.  With the right hand he is able to make a composite fist and extend all digits.  Sensation to light touch is intact in the dorsum of the thumb and all fingers on the right.  Radial artery pulses palpable at the volar wrist on the right.    ASSESSMENT: Bilateral chronic dorsal hand wounds.    PLAN:   - Recommended complete smoking cessation prior to any operative intervention. Patient will pursue smoking cessation programs with his PCP.  - In the meantime, continue washing the wounds with soap water and perform twice daily dressing changes with Adaptic and gauze.  - Recommended aggressive active and passive ROM exercises with both hands to keep joints supple and tendons freely gliding.  - Photos were taken today.  - F/U in 3 - 4 weeks with preclinic XR of both hands.    Total time spent with patient was 30 min of which greater than 50% was in counseling.    Again, thank you for allowing me to participate in the care of your patient.      Sincerely,    Felton Eng MD

## 2017-12-21 NOTE — MR AVS SNAPSHOT
After Visit Summary   12/21/2017    Jeffery Palmer    MRN: [id number]           Patient Information     Date Of Birth          1985        Visit Information        Provider Department      12/21/2017 6:00 PM Felton Eng MD [facility]        Today's Diagnoses     Open wound    -  1       Follow-ups after your visit        Your next 10 appointments already scheduled     Satnam 10, 2018  8:00 AM CST   (Arrive by 7:45 AM)   New Plastic Surgery with KRYSTIN Ortega MD   [facility] ([facility])    [address] [place] [id number] [phone]           Do not wear perfume.            Jan 18, 2018  5:30 PM CST   (Arrive by 5:15 PM)   RETURN HAND with Felton Eng MD   [facility] ([facility])    [address] [place] [id number] [phone]              Who to contact     Please call your clinic at [phone] to:    Ask questions about your health    Make or cancel appointments    Discuss your medicines    Learn about your test results    Speak to your doctor   If you have compliments or concerns about an experience at your clinic, or if you wish to file a complaint, please contact [facility] Patient Relations at [phone] or email us at [email or web address]         Additional Information About Your Visit        MyChart Information     Accel[employer]yt is an electronic gateway that provides easy, online access to your medical records. With [email or web address], you can request a clinic appointment, read your test results, renew a prescription or communicate with your care team.     To sign up for [employer] visit the website at [email or web address]   You will be asked to enter the access code listed below, as well as some personal information. Please follow the directions to create your username and  "password.     Your access code is: [email or web address]  Expires: 3/22/2018 11:23 AM     Your access code will  in 90 days. If you need help or a new code, please contact your [facility] or call [phone] for assistance.        Care EveryWhere ID     This is your Care EveryWhere ID. This could be used by other organizations to access your [facility] medical records  [id number]        Your Vitals Were     Height BMI (Body Mass Index)                1.861 m (6' 1.27\") 24.23 kg/m2           Blood Pressure from Last 3 Encounters:   17 125/74   10/09/16 132/60   01/15/13 (!) 150/96    Weight from Last 3 Encounters:   17 83.9 kg (185 lb)   17 89.4 kg (197 lb 1.5 oz)   17 91.4 kg (201 lb 6.4 oz)              Today, you had the following     No orders found for display         Today's Medication Changes          These changes are accurate as of: 17 11:59 PM.  If you have any questions, ask your nurse or doctor.               Start taking these medicines.        Dose/Directions    ADAPTIC NON-ADHERING DRESSING Pads   Used for:  Open wound   Started by:  Felton Eng MD        Dose:  1 Piece   Externally apply 1 Piece topically 2 times daily   Quantity:  30 each   Refills:  1       * KERLIX GAUZE ROLL MEDIUM Misc   Used for:  Open wound   Started by:  Felton Eng MD        Dose:  1 Package   1 Package 2 times daily   Quantity:  30 each   Refills:  1       * Gauze Pads & Dressings 4\"X4-1/2\" Pads   Used for:  Open wound   Started by:  Felton Eng MD        Dose:  1 Package   1 Package 2 times daily   Quantity:  30 each   Refills:  1       * Notice:  This list has 2 medication(s) that are the same as other medications prescribed for you. Read the directions carefully, and ask your doctor or other care provider to review them with you.         Where to get your medicines      Some of these will need a paper prescription and others can be bought over the counter. " " Ask your nurse if you have questions.     Bring a paper prescription for each of these medications     ADAPTIC NON-ADHERING DRESSING Pads    Gauze Pads & Dressings 4\"X4-1/2\" Pads    KERLIX GAUZE [facility]LL MEDIUM [facility]                Primary Care Provider Office Phone # Fax #    Carmen Kennedy [phone]       [facility] [address]  [facility] [address]        Equal Access to Services     GARRY ODOM AH: Hadii aad ku hadasho Soomaali, waaxda luqadaha, qaybta kaalmada adeegyada, waxay idiin hayaan adeeg kharash la'aan ah. So [facility] [phone].    ATENCIÓN: Si habla español, tiene a cardona disposición servicios gratuitos de asistencia lingüística. [facility] [phone].    We comply with applicable federal civil rights laws and [place] laws. We do not discriminate on the basis of race, color, national origin, age, disability, sex, sexual orientation, or gender identity.            Thank you!     Thank you for choosing [facility]  for your care. Our goal is always to provide you with excellent care. Hearing back from our patients is one way we can continue to improve our services. Please take a few minutes to complete the written survey that you may receive in the mail after your visit with us. Thank you!             Your Updated Medication List - Protect others around you: Learn how to safely use, store and throw away your medicines at www.disposemymeds.org.          This list is accurate as of: 12/21/17 11:59 PM.  Always use your most recent med list.                   Brand Name Dispense Instructions for use Diagnosis    ADAPTIC NON-ADHERING DRESSING Pads     30 each    Externally apply 1 Piece topically 2 times daily    Open wound       carisoprodol 350 MG tablet    SOMA     1 tab po qhs prn muscle spasm        clindamycin 1 % topical gel    CLINDAMAX          * CYMBALTA PO      Take 60 mg by mouth daily        * DULoxetine 30 MG EC capsule    CYMBALTA     Take 30 mg by mouth        * " "KERLIX GAUZE ROLL MEDIUM Misc     30 each    1 Package 2 times daily    Open wound       * Gauze Pads & Dressings 4\"X4-1/2\" Pads     30 each    1 Package 2 times daily    Open wound       LANsoprazole 30 MG CR capsule    PREVACID     One daily        * LORazepam 1 MG tablet    ATIVAN     1 TABLET ONCE DAILY        * LORazepam 1 MG tablet    ATIVAN     Take 1 mg by mouth        TEMAZEPAM PO      Take 15 mg by mouth At Bedtime        * Notice:  This list has 6 medication(s) that are the same as other medications prescribed for you. Read the directions carefully, and ask your doctor or other care provider to review them with you.      "

## 2017-12-22 NOTE — PROGRESS NOTES
REFERRING PROVIDER: Carmen Kennedy    REASON FOR CONSULTATION: Bilateral chronic dorsal hand wounds.    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  He was referred to our clinic for evaluation and management of these wounds.  Patient reports that the wounds have slowly enlarged and plateaued in size recently.  He disclosed to me his history of heroin drug addiction and frequent injection of the drug into his dorsal hand veins.  He has been diagnosed with MRSA infection in these wounds.  He has also undergone biopsy of the wounds to rule out squamous cell carcinoma.  Biopsy results did not show any sign of malignancy.  He has been performing daily to twice daily dressing changes with a nonadherent dressing.      MEDS:   Prior to Admission medications    Medication Sig Start Date End Date Taking? Authorizing Provider   DULoxetine (CYMBALTA) 30 MG EC capsule Take 30 mg by mouth 1/9/17   Reported, Patient   LORazepam (ATIVAN) 1 MG tablet Take 1 mg by mouth 8/2/17   Reported, Patient   carisoprodol (SOMA) 350 MG tablet 1 tab po qhs prn muscle spasm 8/11/17   Reported, Patient   clindamycin (CLINDAMAX) 1 % topical gel  5/29/17   Reported, Patient   LANsoprazole (PREVACID) 30 MG CR capsule One daily 4/17/17   Reported, Patient   DULoxetine HCl (CYMBALTA PO) Take 60 mg by mouth daily    Reported, Patient   TEMAZEPAM PO Take 15 mg by mouth At Bedtime     Reported, Patient   LORAZEPAM 1 MG OR TABS 1 TABLET ONCE DAILY    Reported, Patient       ALLERGIES:   Allergies   Allergen Reactions     Amoxicillin      Other reaction(s): *Unknown - Childhood Rxn     Penicillin [Esters]      Wellbutrin [Bupropion Hydrobromide]      Night terrors       PMH:   Past Medical History:   Diagnosis Date     Anxiety      Depressive disorder      MRSA infection        PSH:   Past Surgical History:   Procedure Laterality Date     BIOPSY  "BONE HAND Bilateral 9/29/2017    Procedure: BIOPSY BONE HAND;  Bilateral Biopsy Of Hands and debridment bilateral hands;  Surgeon: Andi Thompson MD;  Location: [facility] OR       SH:   Social History   Substance Use Topics     Smoking status: Current Every Day Smoker     Packs/day: 1.00     Years: 4.00     Types: Cigarettes     Start date: 8/8/2013     Smokeless tobacco: Never Used     Alcohol use No   Patient reports that he smokes half pack a day and has been doing so for a long time.  He is very interested in quitting smoking.    FH:   Family History   Problem Relation Age of Onset     Depression Mother      Anxiety Disorder Mother      Depression Brother      Anxiety Disorder Brother      Substance Abuse Brother      Anxiety Disorder Brother      Substance Abuse Brother      Depression Brother        ROS: Denies chest pain, shortness of breath, MI, CVA, diabetes, DVT, PE, and bleeding disorders.    PHYSICAL EXAMINATION: Ht 1.861 m (6' 1.27\")  Wt 83.9 kg (185 lb)  BMI 24.23 kg/m2  General: In no acute distress.  Patient smells of heavy tobacco smoke.  On exam of his left hand, there is a 7.5 x 7.5 cm wound over the dorsal hand with exposed subcutaneous tissue and raised skin edges on the borders of the wound.  There is no purulence, and no exposure of deeper structures.  There are multiple scattered scabs and dry necrotic tissue.  He is able to make a composite fist with his left hand and extend all digits.  Sensation to light touch is intact on the dorsum of the thumb and all fingers.  Radial artery pulse is palpable at the volar wrist.  On examination of the patient's right hand, there is a 7 x 5.5 cm wound over the dorsal hand with exposed subcutaneous tissue and a 1 cm exposure of an extensor tendon, likely extensor digitorum to the index finger or extensor indices proprius.  The skin edges along the borders of the wound are raised.  There is no purulence and no other exposure of any deeper structure.  With the " right hand he is able to make a composite fist and extend all digits.  Sensation to light touch is intact in the dorsum of the thumb and all fingers on the right.  Radial artery pulses palpable at the volar wrist on the right.    ASSESSMENT: Bilateral chronic dorsal hand wounds.    PLAN:   - Recommended complete smoking cessation prior to any operative intervention. Patient will pursue smoking cessation programs with his PCP.  - In the meantime, continue washing the wounds with soap water and perform twice daily dressing changes with Adaptic and gauze.  - Recommended aggressive active and passive ROM exercises with both hands to keep joints supple and tendons freely gliding.  - Photos were taken today.  - F/U in 3 - 4 weeks with preclinic XR of both hands.    Total time spent with patient was 30 min of which greater than 50% was in counseling.

## 2017-12-22 NOTE — NURSING NOTE
"Reason For Visit:   Chief Complaint   Patient presents with     Consult     Bilateral hand wounds.  From Dr. Pepe Poole MD: Carmen Kennedy  Ref. MD:Carmen Kennedy    Age: 32 year old    ?  No      Ht 1.861 m (6' 1.27\")  Wt 83.9 kg (185 lb)  BMI 24.23 kg/m2      Pain Assessment  Patient Currently in Pain: Denies Just tightness no pain due to swelling               QuickDASH Assessment  No flowsheet data found.       Current Outpatient Prescriptions   Medication Sig Dispense Refill     DULoxetine (CYMBALTA) 30 MG EC capsule Take 30 mg by mouth       LORazepam (ATIVAN) 1 MG tablet Take 1 mg by mouth       carisoprodol (SOMA) 350 MG tablet 1 tab po qhs prn muscle spasm       clindamycin (CLINDAMAX) 1 % topical gel        LANsoprazole (PREVACID) 30 MG CR capsule One daily       DULoxetine HCl (CYMBALTA PO) Take 60 mg by mouth daily       TEMAZEPAM PO Take 15 mg by mouth At Bedtime        LORAZEPAM 1 MG OR TABS 1 TABLET ONCE DAILY         Allergies   Allergen Reactions     Amoxicillin      Other reaction(s): *Unknown - Childhood Rxn     Penicillin [Esters]      Wellbutrin [Bupropion Hydrobromide]      Night terrors       Lynne Borja, ATC    "

## 2018-01-18 DIAGNOSIS — S61.409A: Primary | ICD-10-CM

## 2019-11-04 ENCOUNTER — HEALTH MAINTENANCE LETTER (OUTPATIENT)
Age: 34
End: 2019-11-04

## 2020-11-22 ENCOUNTER — HEALTH MAINTENANCE LETTER (OUTPATIENT)
Age: 35
End: 2020-11-22

## 2020-12-28 ENCOUNTER — HOSPITAL ENCOUNTER (EMERGENCY)
Facility: CLINIC | Age: 35
Discharge: HOME | End: 2020-12-28
Attending: EMERGENCY MEDICINE | Admitting: EMERGENCY MEDICINE
Payer: COMMERCIAL

## 2020-12-28 ENCOUNTER — TELEPHONE (OUTPATIENT)
Dept: BEHAVIORAL HEALTH | Facility: CLINIC | Age: 35
End: 2020-12-28

## 2020-12-28 VITALS
RESPIRATION RATE: 16 BRPM | DIASTOLIC BLOOD PRESSURE: 83 MMHG | TEMPERATURE: 97 F | OXYGEN SATURATION: 98 % | SYSTOLIC BLOOD PRESSURE: 127 MMHG | HEART RATE: 97 BPM

## 2020-12-28 DIAGNOSIS — F11.220 OPIOID DEPENDENCE WITH UNCOMPLICATED INTOXICATION (H): ICD-10-CM

## 2020-12-28 PROCEDURE — 99283 EMERGENCY DEPT VISIT LOW MDM: CPT | Performed by: EMERGENCY MEDICINE

## 2020-12-28 PROCEDURE — 99284 EMERGENCY DEPT VISIT MOD MDM: CPT | Performed by: EMERGENCY MEDICINE

## 2020-12-28 RX ORDER — HYDROXYZINE PAMOATE 25 MG/1
25 CAPSULE ORAL 3 TIMES DAILY PRN
Qty: 9 CAPSULE | Refills: 0 | Status: SHIPPED | OUTPATIENT
Start: 2020-12-28 | End: 2020-12-30

## 2020-12-28 RX ORDER — CLONIDINE HYDROCHLORIDE 0.1 MG/1
0.1 TABLET ORAL EVERY 6 HOURS PRN
Qty: 8 TABLET | Refills: 0 | Status: SHIPPED | OUTPATIENT
Start: 2020-12-28 | End: 2020-12-30

## 2020-12-28 RX ORDER — ONDANSETRON 4 MG/1
4 TABLET, ORALLY DISINTEGRATING ORAL EVERY 6 HOURS PRN
Qty: 6 TABLET | Refills: 0 | Status: SHIPPED | OUTPATIENT
Start: 2020-12-28 | End: 2021-01-08

## 2020-12-28 NOTE — DISCHARGE INSTRUCTIONS
An appointment has been made for you at the [facility] on 12/30 at 9:30am.  In the meantime you may take clonidine for withdrawal symptoms, ondansetron for nausea, and hydroxyzine for anxiety.  Avoid further opioid use to prevent risk of overdose that can be life threatening.    Opioid Withdrawal  Opioid withdrawal occurs in people who have used opioids on a daily basis for at least 3 weeks. Symptoms usually start about 12 hours after the last dose of the opioid. Withdrawal symptoms last from 3 to 5 days and may include yawning, sweating, runny nose, restlessness, stomach cramping, diarrhea, nausea, vomiting, hot and cold flashes, and trouble sleeping.  Home care  The treatment for withdrawal is mostly managing the symptoms without making the problem worse. Follow these guidelines when caring for yourself at home:  Stay with someone who can help you and give you emotional support during this time. Resist the temptation to take more of the addicting drug to stop your symptoms.  If you have stomach cramps, nausea, or vomiting, take only clear liquids until the symptoms improve. Adults should drink a total of 2 to 3 quarts of liquid daily. It is best to take small frequent drinks rather than a few large ones. You may consume liquids in any of the following forms: mineral water, apple juice, sports drinks, soft drinks without caffeine, clear broth soups, plain gelatin, and ice pops.  Don't use alcohol, caffeine, or tobacco during this time.  Take any medicines prescribed for nausea or cramping exactly as directed.  If you were given a clonidine patch, leave this on for 7 days. You may remove it sooner if you develop excess dizziness or drowsiness.  Follow-up care  Follow up with your healthcare provider if you need further symptom control, or as advised. When you are through withdrawal, take the opportunity to enter a treatment program. This may help you stay off the addicting drug.  When to seek medical  advice  Call your healthcare provider right away if any of these occur:  Fever of 100.4 F (38 C) or higher, or as directed by your healthcare provider  Inability to keep down liquids for 8 hours  Frequent diarrhea  Signs of infection at the site of IV needle use (redness, warmth, pain, or swelling)  Call 911  Call 911 if any of these occur:  Trouble breathing or swallowing, or wheezing  Severe confusion  Extreme drowsiness or trouble awakening  Fainting or loss of consciousness  Rapid heart rate or very slow heart rate  Very low or very high blood pressure  Vomiting blood, or large amounts of blood in stool  Seizure  StayWell last reviewed this educational content on 3/1/2017    0461-8357 [employer]. [address] [place] [address], [place] [address]. All rights reserved. This information is not intended as a substitute for professional medical care. Always follow your healthcare professional's instructions.          Naloxone (Narcan)  Frequently-asked questions  What is naloxone (Narcan)?  Naloxone (Narcan) is a prescription medicine that can reverse an overdose from opioid medicines, such as:  codeine, morphine  hydrocodone, oxycodone, hydromorphone, oxymorphone  fentanyl, meperidine, methadone  buprenorphine  Opioids can cause bad reactions. If you take more opioids than your body can handle (overdose), your breathing can slow too much or even stop. Naloxone blocks the effects of opioids on the brain, which can quickly reverse an overdose.   Naloxone cannot be used to get high and is not addictive. It is safe and effective. Emergency medical professionals have used it to save lives for decades.  Naloxone does not prevent deaths caused by other drugs, such as bath salts, cocaine, methamphetamine, alcohol and benzodiazepines: alprazolam, clonazepam, diazepam, lorazepam.  Who can carry or administer naloxone?  In [place], anyone at risk for having a drug overdose or witnessing one can get a prescription for  "naloxone. Users, family members and concerned friends can all carry naloxone in the same way people with allergies are allowed to carry an epinephrine syringe (\"epi-pen\").   For safety, store naloxone in a locked cabinet or other space that is out of the reach of children and pets.  When should naloxone be given?  If you suspect an opioid overdose, look for these symptoms:  Breathing slows or stops; or, person has pinpoint pupils and won't wake up  No response, even if you shake them or say  their name  Lips and fingernails turn blue, purple or gray  Skin gets painful or clammy  Slowed heartbeat and/or low blood pressure  Even if you have reversed an overdose with naloxone, always call 911. Naloxone usually wears off in 30 to 90 minutes. The person can stop breathing again unless more naloxone is available. An overdose victim will also need other care. For these reasons, it is safest to call 911 and get medical care right away.  How long does naloxone take to work?  Naloxone begins to work in 2 to 5 minutes.  If the person doesn't wake up in 3 minutes, bystanders should give a second dose.  Rescue breathing should be done while you wait for the naloxone to work. This will make sure the person gets enough oxygen to the brain.  How do you give naloxone?  Bystanders can safely and legally spray naloxone into the nose (nasal spray) or inject it into the thigh.   Nasal spray (with assembly): Place the foam tip (atomizer) onto a syringe and put into the nostril. Spray one half of the capsule (1 mg) into each nostril.  Nasal spray (no assembly needed): Spray up one nostril by pushing the plunger.  Injection into the muscle (with assembly or by auto-injector): Inject into the outer thigh. In an emergency, it is safe to inject through clothing. The auto-injector contains a speaker that provides step-by-step instructions.  Can naloxone harm someone?  No. It is safe to give naloxone any time you suspect an overdose. (It will " "not hurt if you are wrong about it being an overdose.) People who receive naloxone for an overdose may wake up and go into withdrawal. Withdrawal can be miserable, but it is better than dying.   Symptoms of withdrawal include:  Feeling nervous, restless or irritable  Body aches  Dizziness or weakness  Diarrhea, stomach pain or feeling a little sick  Fever, chills or goose bumps  Sneezing or runny nose  Talk with your doctor about how to deal with these and other feelings of withdrawal.   Is naloxone just a \"safety net\" that allows users to use even more?  Research has found that having naloxone available does not encourage people to use opioids more. The goal of distributing naloxone and educating people about how to prevent, recognize and intervene in overdoses is to prevent deaths. Other goals, such as decreasing drug use, can only be met if the user is alive.  What are the side effects of naloxone?  Call 911 right away if you have:  An allergic reaction, such as large bumps on your skin (hives), trouble breathing, swelling of the face, lips, tongue or throat. (Don't drive or perform unsafe tasks until you know how you will react to naloxone.)  Chest pain or fast or irregular heart beat  Increase in blood pressure  Muscle pain or cramping  Nasal dryness, congestion or inflammation  Shortness of breath  Sweating, feeling very sick to your stomach or throwing up  Bad headache, agitation, anxiety, confusion or ringing in your ears  Seizures (convulsions)  Dizziness, fainting or feeling like you might pass out  For informational purposes only. Not to replace the advice of your health care provider.   Copyright   2015 [facility]. All rights reserved. [employer] 263805 - REV 07/17.      Instructions:     To manage symptoms of withdrawal, please take your medicines as directed.     If symptoms are severe and the medicines don t help, return to the emergency room.  You will be evaluated and treated for " withdrawal symptoms which may include additional buprenorphine (Suboxone, Subutex).

## 2020-12-28 NOTE — TELEPHONE ENCOUNTER
Attempted to reach pt to discuss starting treatment for OUD.  No answer, left message requesting call back.   Pt is scheduled for initial visit 12/30/20

## 2020-12-28 NOTE — ED AVS SNAPSHOT
[facility]  [address]  [place] [address]  Phone: [phone]  Fax: [phone]                                    Jeffery Palmer   MRN: [id number]    Department: [facility]   Date of Visit: 12/28/2020           After Visit Summary Signature Page    I have received my discharge instructions, and my questions have been answered. I have discussed any challenges I see with this plan with the nurse or doctor.    ..........................................................................................................................................  Patient/Patient Representative Signature      ..........................................................................................................................................  Patient Representative Print Name and Relationship to Patient    ..................................................               ................................................  Date                                   Time    ..........................................................................................................................................  Reviewed by Signature/Title    ...................................................              ..............................................  Date                                               Time          22EPIC Rev 08/18

## 2020-12-28 NOTE — ED PROVIDER NOTES
"    [facility] EMERGENCY DEPARTMENT ([facility])    12/28/20        History     Chief Complaint   Patient presents with     Addiction Problem     Seeking detox from opiates and benzos, last use of both this morning      The history is provided by the patient and medical records.     Jeffery Palmer is a 35 year old male with a history of chemical dependency, anxiety, and depressive disorder who presents to the Emergency Department seeking detox from opioids. Patient reports he is taking between 10-15 unknown opioid tablets that he suspects are either fentanyl or percocet. He last took part of one pill this morning. Patient reports he smokes these tablets and has had some cough with this. He denies fevers or sores. No IV use since 7/2018. Patient reports he also takes 1 mg ativan and 15 mg temazepam nightly. He is prescribed these by his psychiatrist for sleep and anxiety and is taking them as prescribed. Patient reports he is also on Seroquel and Cymbalta. His Seroquel was recently decreased. Patient reports he recently also took Xanax off the street but does not do this regularly.  He denies any other acute medical or mental health concerns.    Past Medical History  Past Medical History:   Diagnosis Date     Anxiety      Depressive disorder      MRSA infection      Past Surgical History:   Procedure Laterality Date     BIOPSY BONE HAND Bilateral 9/29/2017    Procedure: BIOPSY BONE HAND;  Bilateral Biopsy Of Hands and debridment bilateral hands;  Surgeon: Andi Thompson MD;  Location: [facility] OR          carisoprodol (SOMA) 350 MG tablet       DULoxetine (CYMBALTA) 30 MG EC capsule       DULoxetine HCl (CYMBALTA PO)       LANsoprazole (PREVACID) 30 MG CR capsule       LORazepam (ATIVAN) 1 MG tablet       LORAZEPAM 1 MG OR TABS       TEMAZEPAM PO       clindamycin (CLINDAMAX) 1 % topical gel       Gauze Pads & Dressings (KERLIX GAUZE ROLL MEDIUM) MISC       Gauze Pads & Dressings 4\"X4-1/2\" PADS       Wound " Dressings (ADAPTIC NON-ADHERING DRESSING) PADS      Allergies   Allergen Reactions     Amoxicillin      Other reaction(s): *Unknown - Childhood Rxn     Penicillin [Penicillins]      Vancomycin      Wellbutrin [Bupropion Hydrobromide]      Night terrors     Family History  Family History   Problem Relation Age of Onset     Depression Mother      Anxiety Disorder Mother      Depression Brother      Anxiety Disorder Brother      Substance Abuse Brother      Anxiety Disorder Brother      Substance Abuse Brother      Depression Brother      Social History   Social History     Tobacco Use     Smoking status: Current Every Day Smoker     Packs/day: 1.00     Years: 4.00     Pack years: 4.00     Types: Cigarettes     Start date: 8/8/2013     Smokeless tobacco: Never Used   Substance Use Topics     Alcohol use: No     Drug use: Yes     Types: Opiates, Benzodiazepines, Fentanyl, Flunitrazepam     Comment: hx of benzos(ativan 1mg) and opiate (fentanyl 30mg)      Past medical history, past surgical history, medications, allergies, family history, and social history were reviewed with the patient. No additional pertinent items.       Review of Systems   All other systems reviewed and are negative.    A complete review of systems was performed with pertinent positives and negatives noted in the HPI, and all other systems negative.    Physical Exam   BP: (!) 144/84  Pulse: 105  Temp: 97.8  F (36.6  C)  SpO2: 96 %  Physical Exam  General: patient is alert and oriented and in no acute distress   Head: atraumatic and normocephalic   EENT: moist mucus membranes, sclera anicteric   neck: supple   Cardiovascular: regular rate and rhythm, extremities warm and well perfused  Pulmonary: No respiratory distress   musculoskeletal: normal range of motion   Neurological: alert and oriented, moving all extremities symmetrically, gait normal   Skin: warm, dry   Psych: Flat affect, well-groomed, speech is normal in rate and tone, does not appear  to be responding to internal stimuli, denies SI or HI    ED Course       11:30 AM  The patient was seen and examined by Lyssa Gordillo MD in Room ED16A.   Procedures                         No results found for any visits on 12/28/20.  Medications - No data to display     Assessments & Plan (with Medical Decision Making)   Mr. Palmer is a 35 year old male with a history of chemical dependency, anxiety, and depressive disorder who presents to the Emergency Department seeking detox from opioids.  Currently he is not experience significant withdrawal symptoms. He has a COWS score of 8 and would like to defer starting suboxone at this time.  He does not have any acute medical or mental health concerns to warrant hospitalization.  Initially he did request to detox from benzodiazepines as well however he has been taking these as prescribed and they have been working for him.  I have recommended that he discuss with his psychiatrist regarding a tapering plan and possible alternative medication if he truly wishes to be off all benzodiazepines.  He is in agreement with this plan.  [facility].  He was given clonidine, hydroxyzine and Zofran in the emergency department to help with any withdrawal symptoms in the interim and voiced understanding.    I have reviewed the nursing notes. I have reviewed the findings, diagnosis, plan and need for follow up with the patient.    New Prescriptions    CLONIDINE (CATAPRES) 0.1 MG TABLET    Take 1 tablet (0.1 mg) by mouth every 6 hours as needed (chills, sweating, opioid withdrawal symptoms)    HYDROXYZINE (VISTARIL) 25 MG CAPSULE    Take 1 capsule (25 mg) by mouth 3 times daily as needed for anxiety    NALOXONE (NARCAN) 4 MG/0.1ML NASAL SPRAY    Spray 1 spray (4 mg) into one nostril alternating nostrils once as needed for opioid reversal every 2-3 minutes until assistance arrives    ONDANSETRON (ZOFRAN ODT) 4 MG ODT TAB    Take 1 tablet (4 mg)  by mouth every 6 hours as needed for nausea or vomiting       Final diagnoses:   Opioid dependence with uncomplicated intoxication (H)     I, Lori Srivastava, am serving as a trained medical scribe to document services personally performed by Lyssa Reyes MD, based on the provider's statements to me.      I, Lyssa Reyes MD, was physically present and have reviewed and verified the accuracy of this note documented by Lori Srivastava.     --  Lyssa Reyes MD  [facility] EMERGENCY DEPARTMENT  12/28/2020     Lyssa Reyes MD  12/28/20 1152       Lyssa Reyes MD  12/28/20 1215

## 2020-12-29 NOTE — PROGRESS NOTES
Jeffery Palmer is a 35 year old male with PMH multiple orthopedic and reconstructive procedures to R>L hands originating from infections related to IVDU complicated by postoperative infections [id number], anxiety, depression, and OUD who is being seen in [facility] today for initial evaluation.  Pt was referred by [facility] ED where he presented requesting assistance for withdrawal from opioids and benzodiazepines.    Pt reports most recent use consisting primarily of illicitly manufactured opioid tablets by smoking, 10-15 tablets/day, 7days/week, for the last ~1 1/2 years most recent use 20 8am.    He also takes lorazepam 1mg tabs and temazepam 15mg tabs that he is prescribed by his psychiatrist Nancy Lou MD and alprazolam recently from nonprescription sources.     Treatments they have tried in the past include buprenorphine for treatment of withdrawal only.  He has participated in at least 3 psychosocial treatments for GRICEL since .  He was prescribed oral naltrexone for OUD by PCP  History of overdose: denies  History of IV use: yes, denies since 2018  HCV/HIV status/screenin2020 both negative, also negative 2020  Current withdrawal symptoms described by patient include none given recent use.              Additional Substance Use/Behavioral Addiction Histories:  Opioids: heroin IV for approximately 10 years, last use 2018.  Pt began smoking fentanyl as noted above.     Alcohol:  denies    Nicotine: 1 ppd    Stimulants: denies    Benzodiazepines:  States he takes lorazepam and temazepam nightly, rarely takes the additional prescribed prn dose.  He endorsed using alprazolam from nonprescription source in the week prior to recent ED visit but states he has since discontinued this    Cannabis: denies    Hallucinogens: denies    Behavioral addictions (disordered eating, gambling, shopping, sex, internet:) denies      DSM 5 OUD Criteria:  Taken in larger amounts/greater time spent  in behavior over longer period of time than intended,: yes    Persistent desire or unsuccessful efforts to cut down or control use/behavior: yes    A great deal of time is spent in activities necessary to obtain the substance/participate in the behavior or recover from its effects: yes    Cravings: yes    Recurrent use/behavior resulting in failure to fulfill major role obligations at work, school, or home: yes    Continued use/behavior despite having persistent or recurrent social or interpersonal problems caused or exacerbated by effects of use/behavior: yes    Important social, occupational, or recreational activities are given up or reduced because of use/behavior: yes    Recurrent use/behavior in situations in which it is physically hazardous: yes    Continued use/behavior despite knowledge of having a persistent or recurrent physical or psychological problem that is likely to have been caused or exacerbated by use/behavior: yes    Tolerance: yes    Withdrawal: yes      Past Medical History:   Diagnosis Date     Anxiety      Complication, postoperative infection 2019    R hand, s/p multiple debridements, IV abx courses, oral abx     Depressive disorder         Pt's psychiatric history is significant for depresion.   There is no history of suicidal ideation/suicide attempt.    There is no known history of seizures.      Medications:       cyclobenzaprine (FLEXERIL) 10 MG tablet, Take 10 mg by mouth 3 times daily as needed       DULoxetine HCl (CYMBALTA PO), Take 60 mg by mouth daily       LORAZEPAM 1 MG OR TABS, 1 TABLET ONCE DAILY       ondansetron (ZOFRAN ODT) 4 MG ODT tab, Take 1 tablet (4 mg) by mouth every 6 hours as needed for nausea or vomiting       QUEtiapine (SEROQUEL) 50 MG tablet, Take 50 mg by mouth At Bedtime       TEMAZEPAM PO, Take 15 mg by mouth At Bedtime        naloxone (NARCAN) 4 MG/0.1ML nasal spray, Spray 1 spray (4 mg) into one nostril alternating nostrils once as needed for opioid  reversal every 2-3 minutes until assistance arrives    No current facility-administered medications on file prior to visit.         Allergies   Allergen Reactions     Amoxicillin      Other reaction(s): *Unknown - Childhood Rxn     Penicillin [Penicillins]      Vancomycin      Wellbutrin [Bupropion Hydrobromide]      Night terrors        Past Surgical History:   Procedure Laterality Date     BIOPSY BONE HAND Bilateral 2017    Procedure: BIOPSY BONE HAND;  Bilateral Biopsy Of Hands and debridment bilateral hands;  Surgeon: Andi Thompson MD;  Location: [facility] OR     DEBRIDEMENT SKIN AND SUBCUTANEOUS TISSUE Bilateral 2018    dorsum hands     DEBRIDEMENT SKIN AND SUBCUTANEOUS TISSUE Right 2019    hand, multiple     extensor tendon reconstruction Right 2019    hand     HC EXCISION TENDON SHEATH LESION, HAND/FINGR Right 2018     IN EXCISION TENDON SHEATH LESION, HAND/FINGR Left 2018       Family History   Problem Relation Age of Onset     Depression Mother      Anxiety Disorder Mother      Depression Brother      Anxiety Disorder Brother      Substance Abuse Brother      Anxiety Disorder Brother      Substance Abuse Brother      Depression Brother         Social History     Social History Narrative    Lives in his own apartment    SO  of overdose 3/2020          ROS: 10 point ROS neg other than the symptoms noted above in the HPI.       Objective:  /68 (BP Location: Left arm)   Pulse 97   Temp 97.5  F (36.4  C)   Resp 20   Wt 101.3 kg (223 lb 6.4 oz)   BMI 29.26 kg/m      Physical Exam  Constitutional:       Comments: Dressed appropriately for climate, conversing without difficulty   HENT:      Head: Normocephalic and atraumatic.   Pulmonary:      Effort: Pulmonary effort is normal.   Musculoskeletal:        Arms:    Skin:         Neurological:      Mental Status: He is alert and oriented to person, place, and time.      Coordination: Coordination is intact.      Gait: Gait is  intact.   Psychiatric:         Attention and Perception: Attention and perception normal.         Mood and Affect: Mood is depressed. Affect is flat.         Speech: Speech normal.         Behavior: Behavior is cooperative.         Thought Content: Thought content normal.         Cognition and Memory: Cognition and memory normal.      Comments: Insight and judgement are fair  Denies suicidal ideation          Labs:  UDS + benzodiazepine (expected)    Recent Results (from the past 168 hour(s))   **Comprehensive metabolic panel FUTURE anytime   Result Value Ref Range Status    Sodium 142 133 - 144 mmol/L Final    Potassium 4.1 3.4 - 5.3 mmol/L Final    Chloride 111 (H) 94 - 109 mmol/L Final    Carbon Dioxide 25 20 - 32 mmol/L Final    Anion Gap 6 3 - 14 mmol/L Final    Glucose 110 (H) 70 - 99 mg/dL Final    Urea Nitrogen 18 7 - 30 mg/dL Final    Creatinine 0.80 0.66 - 1.25 mg/dL Final    GFR Estimate >90 >60 mL/min/[1.73_m2] Final    GFR Estimate If Black >90 >60 mL/min/[1.73_m2] Final    Calcium 8.9 8.5 - 10.1 mg/dL Final    Bilirubin Total 0.2 0.2 - 1.3 mg/dL Final    Albumin 3.7 3.4 - 5.0 g/dL Final    Protein Total 6.9 6.8 - 8.8 g/dL Final    Alkaline Phosphatase 90 40 - 150 U/L Final    ALT 43 0 - 70 U/L Final    AST 27 0 - 45 U/L Final     *Note: Due to a large number of results and/or encounters for the requested time period, some results have not been displayed. A complete set of results can be found in Results Review.           Assessment/Plan:  1. Opioid use disorder, severe, dependence (H)  Reviewed directions below for initiation of buprenorphine to minimize risk of precipitated withdrawal and maximize efficacy.   Pt confirmed he has naloxone in his possession.   Will attempt to obtain baseline cbc, cmp; pt did have negative hcv and hiv screening 5/2020 and no potential exposures since that time.   Reviewed f/u plan below  At this time pt is stating he does not want to continue buprenorphine treatment, but  instead wants to taper off buprenorphine over the next month or so.  We reviewed options for treatment including extended release buprenorphine and extended release naltrexone.  Will continue discussion of benefits of long term treatment for OUD at follow up visit.     - **CBC with platelets FUTURE anytime; Future  - **Comprehensive metabolic panel FUTURE anytime; Future  - naloxone (NARCAN) 4 MG/0.1ML nasal spray; Spray 1 spray (4 mg) into one nostril alternating nostrils as needed for opioid reversal every 2-3 minutes until assistance arrives  Dispense: 0.2 mL; Refill: 11  - tiZANidine (ZANAFLEX) 4 MG tablet; Take 1-2 tablets (4-8 mg) by mouth 3 times daily  Dispense: 60 tablet; Refill: 0  - promethazine (PHENERGAN) 25 MG tablet; Take 1 tablet (25 mg) by mouth every 6 hours as needed for nausea  Dispense: 30 tablet; Refill: 0  - loperamide (IMODIUM A-D) 2 MG tablet; Take 1 tablet (2 mg) by mouth 4 times daily as needed for diarrhea  Dispense: 30 tablet; Refill: 0  - LANsoprazole (PREVACID) 30 MG DR capsule; Take 1 capsule (30 mg) by mouth daily  Dispense: 30 capsule; Refill: 0  - buprenorphine HCl-naloxone HCl (SUBOXONE) 8-2 MG per film; Place 1 Film under the tongue daily  Dispense: 14 Film; Refill: 0    2. Tobacco use disorder  Reevaluate readiness to change at follow up visits    3. Anxiety disorder, unspecified type  Continue f/u with psychology and psychiatry as planned.   Continue duloxetine, quetiapine, lorazepam, and temazepam as prescribed by psychiatry.  Pt reports he did sign a MALENA for his psychiatrist, but declines communication regarding his use and treatment at this time.      4. Depression, unspecified depression type  As above      Patient Instructions   When it has been 24 hours from your last use of other opioids, start buprenorphine with 2mg (1/4 of 8mg film.)  Wait 30 minutes.    If withdrawal symptoms are not worse, take the remaining 3/4 of film (6mg.)  If withdrawal symptoms do worsen with  "this first \"test dose,\" use other medications for withdrawal symptoms, and start buprenorphine again the next day.     After the first 8mg dose on day one, if you develop more withdrawal symptoms or cravings in 1-2 hours, you can take another 1/2 film (4mg.)  Again, 2 hours later, you can take another 1/2 film (4mg) to treat symptoms.     Starting day 2, take 16mg buprenorphine daily until your next appointment.       [place] [id number] [address] [address]    Phone: [phone]    Hours: Monday, Wednesday, Friday 8:30a-4:30p    After hours medical questions: [phone]         Return in 5 days (on 1/4/2021) for Follow up, with me, in person, 12:00 noon.      Total visit time 45 minutes, >50% of visit spent in counseling and coordination of care regarding recommendations, appropriate use and side effects of medication(s,) risk of overdose, self care, follow up plan, discussion with pt's mother by telephone at pt's request during visit.       RUPAL BEASLEY MD on 12/30/2020 at 11:37 AM      [facility]                  " PA note: Pt reevaluated by me at bedside. Pt with significant improvement in symptoms s/p treatment. Pt currently rates the pain 6/10 and states she feels much better. Results of CT scan reviewed " unremarkable CT study of the brain. No hemorrhagic focus identified. Mucosal thickening is noted in the sinuses" Symptoms likely secondary underlying migraine disorder vs sinus congestion. Pt educated to use nasal decongestant / Claritin for symptomatic relief. Pt educated to return to ED if HA worsens or she develops dizziness / nausea / syncope.

## 2020-12-30 ENCOUNTER — OFFICE VISIT (OUTPATIENT)
Dept: BEHAVIORAL HEALTH | Facility: CLINIC | Age: 35
End: 2020-12-30
Payer: COMMERCIAL

## 2020-12-30 VITALS
DIASTOLIC BLOOD PRESSURE: 68 MMHG | SYSTOLIC BLOOD PRESSURE: 130 MMHG | HEART RATE: 97 BPM | TEMPERATURE: 97.5 F | RESPIRATION RATE: 20 BRPM | WEIGHT: 223.4 LBS | BODY MASS INDEX: 29.26 KG/M2

## 2020-12-30 DIAGNOSIS — F11.20 OPIOID USE DISORDER, SEVERE, DEPENDENCE (H): ICD-10-CM

## 2020-12-30 DIAGNOSIS — F41.9 ANXIETY DISORDER, UNSPECIFIED TYPE: ICD-10-CM

## 2020-12-30 DIAGNOSIS — F11.20 OPIOID USE DISORDER, SEVERE, DEPENDENCE (H): Primary | ICD-10-CM

## 2020-12-30 DIAGNOSIS — F32.A DEPRESSION, UNSPECIFIED DEPRESSION TYPE: ICD-10-CM

## 2020-12-30 DIAGNOSIS — F17.200 TOBACCO USE DISORDER: ICD-10-CM

## 2020-12-30 LAB
ALBUMIN SERPL-MCNC: 3.7 G/DL (ref 3.4–5)
ALP SERPL-CCNC: 90 U/L (ref 40–150)
ALT SERPL W P-5'-P-CCNC: 43 U/L (ref 0–70)
ANION GAP SERPL CALCULATED.3IONS-SCNC: 6 MMOL/L (ref 3–14)
AST SERPL W P-5'-P-CCNC: 27 U/L (ref 0–45)
BILIRUB SERPL-MCNC: 0.2 MG/DL (ref 0.2–1.3)
BUN SERPL-MCNC: 18 MG/DL (ref 7–30)
CALCIUM SERPL-MCNC: 8.9 MG/DL (ref 8.5–10.1)
CHLORIDE SERPL-SCNC: 111 MMOL/L (ref 94–109)
CO2 SERPL-SCNC: 25 MMOL/L (ref 20–32)
CREAT SERPL-MCNC: 0.8 MG/DL (ref 0.66–1.25)
ERYTHROCYTE [DISTWIDTH] IN BLOOD BY AUTOMATED COUNT: 13 % (ref 10–15)
GFR SERPL CREATININE-BSD FRML MDRD: >90 ML/MIN/{1.73_M2}
GLUCOSE SERPL-MCNC: 110 MG/DL (ref 70–99)
HCT VFR BLD AUTO: 44.4 % (ref 40–53)
HGB BLD-MCNC: 15 G/DL (ref 13.3–17.7)
MCH RBC QN AUTO: 30.6 PG (ref 26.5–33)
MCHC RBC AUTO-ENTMCNC: 33.8 G/DL (ref 31.5–36.5)
MCV RBC AUTO: 91 FL (ref 78–100)
PLATELET # BLD AUTO: 212 10E9/L (ref 150–450)
POTASSIUM SERPL-SCNC: 4.1 MMOL/L (ref 3.4–5.3)
PROT SERPL-MCNC: 6.9 G/DL (ref 6.8–8.8)
RBC # BLD AUTO: 4.9 10E12/L (ref 4.4–5.9)
SODIUM SERPL-SCNC: 142 MMOL/L (ref 133–144)
WBC # BLD AUTO: 8.8 10E9/L (ref 4–11)

## 2020-12-30 PROCEDURE — 99204 OFFICE O/P NEW MOD 45 MIN: CPT | Performed by: FAMILY MEDICINE

## 2020-12-30 PROCEDURE — 36416 COLLJ CAPILLARY BLOOD SPEC: CPT | Performed by: FAMILY MEDICINE

## 2020-12-30 PROCEDURE — 80053 COMPREHEN METABOLIC PANEL: CPT | Performed by: FAMILY MEDICINE

## 2020-12-30 PROCEDURE — 85027 COMPLETE CBC AUTOMATED: CPT | Performed by: FAMILY MEDICINE

## 2020-12-30 RX ORDER — LOPERAMIDE HYDROCHLORIDE 2 MG/1
2 TABLET ORAL 4 TIMES DAILY PRN
Qty: 30 TABLET | Refills: 0 | Status: SHIPPED | OUTPATIENT
Start: 2020-12-30

## 2020-12-30 RX ORDER — QUETIAPINE FUMARATE 50 MG/1
50 TABLET, FILM COATED ORAL AT BEDTIME
COMMUNITY

## 2020-12-30 RX ORDER — LANSOPRAZOLE 30 MG/1
30 CAPSULE, DELAYED RELEASE ORAL DAILY
Qty: 30 CAPSULE | Refills: 0 | Status: SHIPPED | OUTPATIENT
Start: 2020-12-30

## 2020-12-30 RX ORDER — CYCLOBENZAPRINE HCL 10 MG
10 TABLET ORAL 3 TIMES DAILY PRN
COMMUNITY

## 2020-12-30 RX ORDER — PROMETHAZINE HYDROCHLORIDE 25 MG/1
25 TABLET ORAL EVERY 6 HOURS PRN
Qty: 30 TABLET | Refills: 0 | Status: SHIPPED | OUTPATIENT
Start: 2020-12-30 | End: 2021-01-08

## 2020-12-30 RX ORDER — BUPRENORPHINE AND NALOXONE 8; 2 MG/1; MG/1
1 FILM, SOLUBLE BUCCAL; SUBLINGUAL DAILY
Qty: 14 FILM | Refills: 0 | Status: SHIPPED | OUTPATIENT
Start: 2020-12-30

## 2020-12-30 NOTE — NURSING NOTE
Reason for visit: New patient    Previously rx'd Suboxone for taper at [facility] in [place] this past summer.     MALENA and Consent to Communicate signed for [facility] (psych provider).    /68 (BP Location: Left arm)   Pulse 97   Temp 97.5  F (36.4  C)   Resp 20   Wt 101.3 kg (223 lb 6.4 oz)   BMI 29.26 kg/m      MN :      Point of care urine drug screen positive for: benzodiazepines (prescribed)  *[facility] urine drug screen does not screen for Fentanyl    Last substance use, if any: Perc 30 pills, smoked, this morning    Does patient have prescription for Narcan? Yes    Coretta Gonzalez RN on 12/30/2020 at 9:35 AM

## 2020-12-30 NOTE — PATIENT INSTRUCTIONS
"When it has been 24 hours from your last use of other opioids, start buprenorphine with 2mg (1/4 of 8mg film.)  Wait 30 minutes.    If withdrawal symptoms are not worse, take the remaining 3/4 of film (6mg.)  If withdrawal symptoms do worsen with this first \"test dose,\" use other medications for withdrawal symptoms, and start buprenorphine again the next day.     After the first 8mg dose on day one, if you develop more withdrawal symptoms or cravings in 1-2 hours, you can take another 1/2 film (4mg.)  Again, 2 hours later, you can take another 1/2 film (4mg) to treat symptoms.     Starting day 2, take 16mg buprenorphine daily until your next appointment.       [address] [address]    Phone: [phone]    Hours: Monday, Wednesday, Friday 8:30a-4:30p    After hours medical questions: [phone]    "

## 2021-01-04 ENCOUNTER — OFFICE VISIT (OUTPATIENT)
Dept: BEHAVIORAL HEALTH | Facility: CLINIC | Age: 36
End: 2021-01-04
Payer: COMMERCIAL

## 2021-01-04 VITALS
DIASTOLIC BLOOD PRESSURE: 74 MMHG | SYSTOLIC BLOOD PRESSURE: 135 MMHG | RESPIRATION RATE: 20 BRPM | TEMPERATURE: 97.7 F | HEART RATE: 94 BPM

## 2021-01-04 DIAGNOSIS — F11.20 OPIOID USE DISORDER, SEVERE, DEPENDENCE (H): Primary | ICD-10-CM

## 2021-01-04 DIAGNOSIS — F41.9 ANXIETY DISORDER, UNSPECIFIED TYPE: ICD-10-CM

## 2021-01-04 DIAGNOSIS — F32.A DEPRESSION, UNSPECIFIED DEPRESSION TYPE: ICD-10-CM

## 2021-01-04 DIAGNOSIS — F17.200 TOBACCO USE DISORDER: ICD-10-CM

## 2021-01-04 PROCEDURE — 99214 OFFICE O/P EST MOD 30 MIN: CPT | Performed by: FAMILY MEDICINE

## 2021-01-04 NOTE — PATIENT INSTRUCTIONS
"To start buprenorphine using \"microdosing\" approach:    Day 1: take 0.5mg buprenorphine  Day 2: take 0.5mg twice daily  Day 3: take 1mg twice daily  Day 4: take 2mg twice daily  Day 5: take 4mg twice daily ; stop other opioid use    After day 5, increase buprenorphine to 16mg daily      "

## 2021-01-04 NOTE — NURSING NOTE
Reason for visit: Follow up    Patient has not started Suboxone. Last use was yesterday morning or early afternoon. Unsure what he snorted yesterday.     /74 (BP Location: Left arm)   Pulse 94   Temp 97.7  F (36.5  C)   Resp 20     MN : reviewed, no concerns    Point of care urine drug screen positive for: benzodiazepines and morphine  *[facility] urine drug screen does not screen for Fentanyl    Does patient have prescription for Narcan? Yes    Clinical Opioid Withdrawal Scale   COWS Scoring    Resting Pulse Rate  1  =     Sweating    (over past 1/2 hour) 3  =  beads of sweat on brow or face   Restlessness  1  =  reports difficulty sitting still, but is able to do so   Pupil size  1  =  pupils possibly larger than normal for room light   Bone or Joint Aches    (acute only) 0  =  not present   Runny nose or tearing    (unrelated to cold/allergies) 0  =  not present   GI Upset    (over past 1/2 hour) 3  =  vomiting or diarrhea   Tremor    (outstretched hands) 2  =  slight tremor observable   Yawning    (during assessment) 0  =  no yawning   Anxiety/Irritability 1  =  patient reports increasing irritability or anxiousness   Gooseflesh skin 0  =  skin is smooth     TOTAL SCORE  Add column for score   12       Coretta Gonzalez RN on 1/4/2021 at 12:16 PM

## 2021-01-06 ENCOUNTER — HOSPITAL ENCOUNTER (OUTPATIENT)
Dept: BEHAVIORAL HEALTH | Facility: CLINIC | Age: 36
Discharge: HOME | End: 2021-01-06
Attending: FAMILY MEDICINE | Admitting: FAMILY MEDICINE
Payer: COMMERCIAL

## 2021-01-06 VITALS — BODY MASS INDEX: 26.95 KG/M2 | HEIGHT: 74 IN | WEIGHT: 210 LBS

## 2021-01-06 PROCEDURE — H0001 ALCOHOL AND/OR DRUG ASSESS: HCPCS | Mod: HF,GT

## 2021-01-06 ASSESSMENT — ANXIETY QUESTIONNAIRES
4. TROUBLE RELAXING: MORE THAN HALF THE DAYS
2. NOT BEING ABLE TO STOP OR CONTROL WORRYING: MORE THAN HALF THE DAYS
7. FEELING AFRAID AS IF SOMETHING AWFUL MIGHT HAPPEN: NOT AT ALL
5. BEING SO RESTLESS THAT IT IS HARD TO SIT STILL: NOT AT ALL
1. FEELING NERVOUS, ANXIOUS, OR ON EDGE: MORE THAN HALF THE DAYS
6. BECOMING EASILY ANNOYED OR IRRITABLE: SEVERAL DAYS
IF YOU CHECKED OFF ANY PROBLEMS ON THIS QUESTIONNAIRE, HOW DIFFICULT HAVE THESE PROBLEMS MADE IT FOR YOU TO DO YOUR WORK, TAKE CARE OF THINGS AT HOME, OR GET ALONG WITH OTHER PEOPLE: SOMEWHAT DIFFICULT
GAD7 TOTAL SCORE: 9
3. WORRYING TOO MUCH ABOUT DIFFERENT THINGS: MORE THAN HALF THE DAYS

## 2021-01-06 ASSESSMENT — COLUMBIA-SUICIDE SEVERITY RATING SCALE - C-SSRS
TOTAL  NUMBER OF ABORTED OR SELF INTERRUPTED ATTEMPTS PAST LIFETIME: NO
REASONS FOR IDEATION LIFETIME: DOES NOT APPLY
5. HAVE YOU STARTED TO WORK OUT OR WORKED OUT THE DETAILS OF HOW TO KILL YOURSELF? DO YOU INTEND TO CARRY OUT THIS PLAN?: NO
TOTAL  NUMBER OF INTERRUPTED ATTEMPTS LIFETIME: NO
TOTAL  NUMBER OF ABORTED OR SELF INTERRUPTED ATTEMPTS PAST 3 MONTHS: NO
TOTAL  NUMBER OF INTERRUPTED ATTEMPTS PAST 3 MONTHS: NO
4. HAVE YOU HAD THESE THOUGHTS AND HAD SOME INTENTION OF ACTING ON THEM?: NO
ATTEMPT LIFETIME: NO
6. HAVE YOU EVER DONE ANYTHING, STARTED TO DO ANYTHING, OR PREPARED TO DO ANYTHING TO END YOUR LIFE?: NO
1. IN THE PAST MONTH, HAVE YOU WISHED YOU WERE DEAD OR WISHED YOU COULD GO TO SLEEP AND NOT WAKE UP?: NO
1. IN THE PAST MONTH, HAVE YOU WISHED YOU WERE DEAD OR WISHED YOU COULD GO TO SLEEP AND NOT WAKE UP?: NO
4. HAVE YOU HAD THESE THOUGHTS AND HAD SOME INTENTION OF ACTING ON THEM?: NO
REASONS FOR IDEATION PAST MONTH: DOES NOT APPLY
ATTEMPT PAST THREE MONTHS: NO
5. HAVE YOU STARTED TO WORK OUT OR WORKED OUT THE DETAILS OF HOW TO KILL YOURSELF? DO YOU INTEND TO CARRY OUT THIS PLAN?: NO
2. HAVE YOU ACTUALLY HAD ANY THOUGHTS OF KILLING YOURSELF?: NO
6. HAVE YOU EVER DONE ANYTHING, STARTED TO DO ANYTHING, OR PREPARED TO DO ANYTHING TO END YOUR LIFE?: NO
2. HAVE YOU ACTUALLY HAD ANY THOUGHTS OF KILLING YOURSELF LIFETIME?: NO
3. HAVE YOU BEEN THINKING ABOUT HOW YOU MIGHT KILL YOURSELF?: NO

## 2021-01-06 ASSESSMENT — PAIN SCALES - GENERAL: PAINLEVEL: NO PAIN (0)

## 2021-01-06 ASSESSMENT — PATIENT HEALTH QUESTIONNAIRE - PHQ9: SUM OF ALL RESPONSES TO PHQ QUESTIONS 1-9: 10

## 2021-01-06 ASSESSMENT — MIFFLIN-ST. JEOR: SCORE: 1947.3

## 2021-01-06 NOTE — PROGRESS NOTES
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 withdrawal symptoms. He has a COWS score of 8 and would like to defer starting suboxone at this time.  He does not have any acute medical or mental health concerns to warrant hospitalization.  Initially he did request to detox from benzodiazepines as well however he has been taking these as prescribed and they have been working for him.  I have recommended that he discuss with his psychiatrist regarding a tapering plan and possible alternative medication if he truly wishes to be off all benzodiazepines.  He is in agreement with this plan.  [facility].  He was given clonidine, hydroxyzine and Zofran in the emergency department to help with any withdrawal symptoms in the interim and voiced understanding.     I have reviewed the nursing notes. I have reviewed the findings, diagnosis, plan and need for follow up with the patient.          New Prescriptions     CLONIDINE (CATAPRES) 0.1 MG TABLET    Take 1 tablet (0.1 mg) by mouth every 6 hours as needed (chills, sweating, opioid withdrawal symptoms)     HYDROXYZINE (VISTARIL) 25 MG CAPSULE    Take 1 capsule (25 mg) by mouth 3 times daily as needed for anxiety     NALOXONE (NARCAN) 4  MG/0.1ML NASAL SPRAY    Spray 1 spray (4 mg) into one nostril alternating nostrils once as needed for opioid reversal every 2-3 minutes until assistance arrives     ONDANSETRON (ZOFRAN ODT) 4 MG ODT TAB    Take 1 tablet (4 mg) by mouth every 6 hours as needed for nausea or vomiting         Final diagnoses:   Opioid dependence with uncomplicated intoxication (H)      ILori, am serving as a trained medical scribe to document services personally performed by Lyssa Reyes MD, based on the provider's statements to me.       Lyssa GLOVER MD, was physically present and have reviewed and verified the accuracy of this note documented by Lori Srivastava.      --  Lyssa Reyes MD  [facility] EMERGENCY DEPARTMENT  12/28/2020     Lyssa Reyes MD  12/28/20 1152        Lyssa Reyes MD  12/28/20 1215    Collateral Contacts     Name:    NA           Relationship:    NA   Phone Number:    NA   Releases:    No     NA       As evidenced by self report and criteria, client meets the following DSM5 Diagnoses:   (Sustained by DSM5 Criteria Listed Above)  Opioid Use Disorder, Specify if:  In a controlled environment with a severity of:  304.00 (F11.20) Severe  Specify if: In a controlled environment, Specify current severity:  305.1 (F17.200) Severe.    Recommendations:     1. Plan for Safety and Risk Management:   Recommended that patient call 911 or go to the local ED should there be a change in any of these risk factors..          Report to child / adult protection services was NA.     2.  Recommendations for treatment focus:    Alcohol / Substance Use - Heroin; Fentanyl.      3. GRICEL Referrals:    Recommendations:  Complete a residential based or similar treatment program, such as [facility] .  Patient reports they are willing to follow these recommendations. Patient has a history of opiate use and was give treatment options, including Medication Assisted Treatment, and  information on the risks of opiod use disorder including recognizing and responding to opiod overdose.    4. Records:   These were reviewed at time of assessment.   Information in this assessment was obtained from the medical record and  provided by patient who is a fair historian.    Patient will have open access to their mental health medical record.    5.  Daanes: Record has been saved! Assessment ID: [id number]      Provider Name/ Credentials:  Charly Shook MA, [facility]         Phone:[phone]  January 6, 2021

## 2021-01-07 ASSESSMENT — ANXIETY QUESTIONNAIRES: GAD7 TOTAL SCORE: 9

## 2021-01-08 ENCOUNTER — OFFICE VISIT (OUTPATIENT)
Dept: BEHAVIORAL HEALTH | Facility: CLINIC | Age: 36
End: 2021-01-08
Payer: COMMERCIAL

## 2021-01-08 VITALS
HEART RATE: 83 BPM | RESPIRATION RATE: 20 BRPM | TEMPERATURE: 97.9 F | DIASTOLIC BLOOD PRESSURE: 80 MMHG | SYSTOLIC BLOOD PRESSURE: 127 MMHG

## 2021-01-08 DIAGNOSIS — F11.20 OPIOID USE DISORDER, SEVERE, DEPENDENCE (H): ICD-10-CM

## 2021-01-08 DIAGNOSIS — F11.93 OPIOID WITHDRAWAL (H): Primary | ICD-10-CM

## 2021-01-08 PROCEDURE — 99213 OFFICE O/P EST LOW 20 MIN: CPT | Performed by: FAMILY MEDICINE

## 2021-01-08 RX ORDER — PROMETHAZINE HYDROCHLORIDE 25 MG/1
25 TABLET ORAL EVERY 6 HOURS PRN
Qty: 30 TABLET | Refills: 0 | COMMUNITY
Start: 2021-01-08

## 2021-01-08 RX ORDER — CLONIDINE HYDROCHLORIDE 0.1 MG/1
0.1 TABLET ORAL 3 TIMES DAILY PRN
COMMUNITY

## 2021-01-08 RX ORDER — ONDANSETRON 4 MG/1
4 TABLET, ORALLY DISINTEGRATING ORAL EVERY 8 HOURS PRN
Qty: 30 TABLET | Refills: 0 | Status: SHIPPED | OUTPATIENT
Start: 2021-01-08

## 2021-01-08 NOTE — NURSING NOTE
Reason for visit: Follow up    Patient took Suboxone, micro-doses, 1/4 and 1/5, then stopped. Last substance use last evening, unsure what was in pills.     Staying in own apartment again. Mother is elderly and is not comfortable with him coming to the clinic and back to her home. Some of patient's medications are still at mother's so he needs refills (see med list)    /80 (BP Location: Left arm)   Pulse 83   Temp 97.9  F (36.6  C)   Resp 20     MN :      Point of care urine drug screen positive for: benzodiazepines, prescribed  *POC urine drug screen does not screen for Fentanyl    Does patient have prescription for Narcan? Yes    Coretta Gonzalez RN on 1/8/2021 at 2:38 PM

## 2021-01-08 NOTE — PROGRESS NOTES
CC:   Jeffery Palmer is a 35 year old male with PMH multiple orthopedic and reconstructive procedures to R>L hands originating from infections related to IVDU complicated by postoperative infections 7035-2716, anxiety, depression, and OUD who is being evaluated in the [facility] today for follow up.         HPI:  Background: Pt's initial visit in this clinic occurred on 12/30/20.  Pt presented with a 1 1/2 yr history of using illicitly manufactured fentanyl by smoking 10-15 tablets daily.   At his initial visit he was given rx for buprenorphine and directions for home initiation to begin at 24hrs from last use, as well as medications to treat symptoms of withdrawal.     Interval history: pt has not started buprenorphine, last use of opioids 1/3/20 sometime between 10a and 2p.  Pt has questions about treatment options including buprenorphine taper, Vivitrol, and psychosocial treatment.  He states he is still interested in starting buprenorphine for treatment of withdrawal, and possibly taper over the next month      Obstacles to treatment: readiness    MN  review: as expected    Primary care provider:Carmen Kennedy      HCV/HIV screening: both negative 5/19/2020, no potential exposures since       Medications:       cyclobenzaprine (FLEXERIL) 10 MG tablet, Take 10 mg by mouth 3 times daily as needed       DULoxetine HCl (CYMBALTA PO), Take 60 mg by mouth daily       LANsoprazole (PREVACID) 30 MG DR capsule, Take 1 capsule (30 mg) by mouth daily       LORAZEPAM 1 MG OR TABS, 1 TABLET ONCE DAILY       promethazine (PHENERGAN) 25 MG tablet, Take 1 tablet (25 mg) by mouth every 6 hours as needed for nausea       QUEtiapine (SEROQUEL) 50 MG tablet, Take 50 mg by mouth At Bedtime       TEMAZEPAM PO, Take 15 mg by mouth At Bedtime        buprenorphine HCl-naloxone HCl (SUBOXONE) 8-2 MG per film, Place 1 Film under the tongue daily (Patient not taking: Reported on 1/4/2021)       loperamide (IMODIUM A-D) 2 MG  tablet, Take 1 tablet (2 mg) by mouth 4 times daily as needed for diarrhea (Patient not taking: Reported on 2021)       naloxone (NARCAN) 4 MG/0.1ML nasal spray, Spray 1 spray (4 mg) into one nostril alternating nostrils once as needed for opioid reversal every 2-3 minutes until assistance arrives (Patient not taking: Reported on 2021)       ondansetron (ZOFRAN ODT) 4 MG ODT tab, Take 1 tablet (4 mg) by mouth every 6 hours as needed for nausea or vomiting (Patient not taking: Reported on 2021)       tiZANidine (ZANAFLEX) 4 MG tablet, Take 1-2 tablets (4-8 mg) by mouth 3 times daily (Patient not taking: Reported on 2021)    No current facility-administered medications on file prior to visit.       Past Medical History:   Diagnosis Date     Anxiety      Complication, postoperative infection     R hand, s/p multiple debridements, IV abx courses, oral abx     Depressive disorder        Past Surgical History:   Procedure Laterality Date     BIOPSY BONE HAND Bilateral 2017    Procedure: BIOPSY BONE HAND;  Bilateral Biopsy Of Hands and debridment bilateral hands;  Surgeon: Andi Thompson MD;  Location: [facility] OR     DEBRIDEMENT SKIN AND SUBCUTANEOUS TISSUE Bilateral 2018    dorsum hands     DEBRIDEMENT SKIN AND SUBCUTANEOUS TISSUE Right 2019    hand, multiple     extensor tendon reconstruction Right 2019    hand     HC EXCISION TENDON SHEATH LESION, HAND/FINGR Right 2018     [place] EXCISION TENDON SHEATH LESION, HAND/FINGR Left 2018       Social History     Social History Narrative    Lives in his own apartment    SO  of overdose 3/2020           Objective:  /74 (BP Location: Left arm)   Pulse 94   Temp 97.7  F (36.5  C)   Resp 20   Physical Exam  Pulmonary:      Effort: Pulmonary effort is normal.   Skin:     Comments: Scarring present on dorsum B hands c/w previous surgeries/grafts   Neurological:      Mental Status: He is alert and oriented to person, place, and  "time.   Psychiatric:         Attention and Perception: Attention normal.         Mood and Affect: Mood is anxious and depressed.         Speech: Speech normal.         Behavior: Behavior is cooperative.         Thought Content: Thought content normal.         Cognition and Memory: Cognition and memory normal.      Comments: Insight and judgement are fair         Labs:  UDS: +opiates, +benzodiazepines        Assessment/Plan:  1. Opioid use disorder, severe, dependence (H)  Pt has prescription for buprenorphine, has not started treatment yet.    Last use 1/3/21, some time between 10a and 2p.    Reviewed directions given at last visit to start buprenorphine with 2mg at 24hrs from last use and titrate buprenorphine up to 16mg/day as tolerated.   Also reviewed buprenorphine microdosing approach described below to introduce buprenorphine in the setting of fentanyl use.    Encouraged pt to pursue psychosocial treatment as well and discussed options.   Follow up in 4 days, notify medical staff sooner if problems    2. Tobacco use disorder  Reevaluate readiness for cessation at f/u visits    3. Anxiety disorder, unspecified type  Pt currently prescribed temazepam 15mg 1-2/day and lorazepam 1mg 1-2 at bedtime by his psychiatrist.  We have discussed risk of concurrent use of opioids and benzodiazepines due to increased risk of respiratory suppression and death.  Will discuss plan for benzodiazepine taper with pt and his psychiatrist.    Seroquel 50mg at bedtime prescribed by psychiatry  Also prescribed gabapentin 300mg tid by PCP.  This could be increased to assist with benzo taper.     4. Depression, unspecified depression type  Duloxetine 60mg daily prescribed by psychiatry    Patient Instructions   To start buprenorphine using \"microdosing\" approach:    Day 1: take 0.5mg buprenorphine  Day 2: take 0.5mg twice daily  Day 3: take 1mg twice daily  Day 4: take 2mg twice daily  Day 5: take 4mg twice daily ; stop other opioid " use    After day 5, increase buprenorphine to 16mg daily        Return in 4 days (on 1/8/2021) for 2:30p, Follow up, with me, in person.    TT 30 min reviewing initiation of buprenorphine, psychosocial treatment options and therapy recommendations above      RUPAL BEASLEY MD  [facility] - [facility]

## 2021-01-08 NOTE — PROGRESS NOTES
CC:   Jeffery Palmer is a 35 year old male with PMH multiple orthopedic and reconstructive procedures to R>L hands originating from infections related to IVDU complicated by postoperative infections [id number], anxiety, depression, and OUD who is being evaluated in the [facility] today for follow up.           HPI:  Background: Pt's initial visit in this clinic occurred on 12/30/20.  Pt presented with a 1 1/2 yr history of using illicitly manufactured fentanyl by smoking 10-15 tablets daily.   At his initial visit he was given rx for buprenorphine and directions for home initiation to begin at 24hrs from last use, as well as medications to treat symptoms of withdrawal.     Pt's most recent visit occurred on 1/4/21.  Recommendations at that time included:   Initiation of buprenorphine with microdosing    Interval history: pt reports he took a dose of buprenorphine <1mg on 1/4 and 1/5.  He did not experience any negative effects with this dose.  He has continued to smoke 3-4 opioid tablets daily, last use 1/7/21 some time in the evening.  He is experiencing nausea, hot/cold sweats, chills, abd pain.     He states he was asked to leave his mother's apartment due to mother's concerns of COVID exposure.  (Though pt had previously stated he was staying in his own apartment.)  As a result, pt states he does not have several of his medications as requests refills of tizanidine, odansetron, buprenorphine, and Phenergan.  It is noted pt saw providers at different locations on 12/31/20 and today and was prescribed Phenergan. When asked about most recent visit, pt stated he did not need rx for Phenergan.     [place]  review: as expected    Primary care provider: Carmen Kennedy    HCV/HIV screening: both negative 5/2020, pt denies any potential exposures since then      Medications:       cloNIDine (CATAPRES) 0.1 MG tablet, Take 0.1 mg by mouth 3 times daily as needed Needs refill       cyclobenzaprine (FLEXERIL) 10 MG  tablet, Take 10 mg by mouth 3 times daily as needed       DULoxetine HCl (CYMBALTA PO), Take 60 mg by mouth daily       LANsoprazole (PREVACID) 30 MG DR capsule, Take 1 capsule (30 mg) by mouth daily       loperamide (IMODIUM A-D) 2 MG tablet, Take 1 tablet (2 mg) by mouth 4 times daily as needed for diarrhea       LORAZEPAM 1 MG OR TABS, 1 TABLET ONCE DAILY       promethazine (PHENERGAN) 25 MG tablet, Take 1 tablet (25 mg) by mouth every 6 hours as needed for nausea       QUEtiapine (SEROQUEL) 50 MG tablet, Take 50 mg by mouth At Bedtime       TEMAZEPAM PO, Take 15 mg by mouth At Bedtime        buprenorphine HCl-naloxone HCl (SUBOXONE) 8-2 MG per film, Place 1 Film under the tongue daily (Patient not taking: Reported on 2021)    No current facility-administered medications on file prior to visit.       Past Medical History:   Diagnosis Date     Anxiety      Complication, postoperative infection     R hand, s/p multiple debridements, IV abx courses, oral abx     Depressive disorder        Past Surgical History:   Procedure Laterality Date     BIOPSY BONE HAND Bilateral 2017    Procedure: BIOPSY BONE HAND;  Bilateral Biopsy Of Hands and debridment bilateral hands;  Surgeon: Andi Thompson MD;  Location: [facility] OR     DEBRIDEMENT SKIN AND SUBCUTANEOUS TISSUE Bilateral 2018    dorsum hands     DEBRIDEMENT SKIN AND SUBCUTANEOUS TISSUE Right 2019    hand, multiple     extensor tendon reconstruction Right 2019    hand     HC EXCISION TENDON SHEATH LESION, HAND/FINGR Right 2018     [place] EXCISION TENDON SHEATH LESION, HAND/FINGR Left 2018       Social History     Social History Narrative    Lives in his own apartment    SO  of overdose 3/2020           Objective:  /80 (BP Location: Left arm)   Pulse 83   Temp 97.9  F (36.6  C)   Resp 20   Physical Exam  Constitutional:       Comments: Slightly disheveled   Pulmonary:      Effort: Pulmonary effort is normal.   Skin:     Comments:  Scars dorsum B hands c/w previous surgeries  Multiple tattoos B UE's   Neurological:      Mental Status: He is alert and oriented to person, place, and time.      Coordination: Coordination is intact.      Gait: Gait is intact.   Psychiatric:         Attention and Perception: Attention and perception normal.         Mood and Affect: Mood is anxious and depressed. Affect is flat.         Speech: Speech normal.         Behavior: Behavior is cooperative.         Thought Content: Thought content normal.      Comments: Insight and judgement are fair         Labs:  UDS: +benzodiazepines        Assessment/Plan:  1. Opioid use disorder, severe, dependence (H)  Reviewed microdosing schedule for initiation and titration of buprenorphine in setting  fentanyl use.   Pt requested new rx for buprenorphine due to having left his medications at his mother's house.  I recommended pt arrange with his mother a contact-less method for him to acquire his Suboxone films; pt stated he would do that and was certain his medications would be available.   Pt reporting reduction of quantity of use by about 2/3, using other meds prescribed for opioid withdrawal symptoms he has been experiencing with this reduction.   Agree with benzodiazepine taper; pt reports he was told this could be accomplished after admitting to [facility].   Also discussed option of presenting to ED for evaluation and potential admission to inpatient treatment of benzodiazepine and opioid withdrawal at     - naloxone (NARCAN) 4 MG/0.1ML nasal spray; Spray 1 spray (4 mg) into one nostril alternating nostrils once as needed for opioid reversal every 2-3 minutes until assistance arrives  Dispense: 0.2 mL; Refill: 0      2. Opioid withdrawal (H)    - ondansetron (ZOFRAN ODT) 4 MG ODT tab; Take 1 tablet (4 mg) by mouth every 8 hours as needed for nausea or vomiting  Dispense: 30 tablet; Refill: 0  - tiZANidine (ZANAFLEX) 4 MG tablet; Take 1-2 tablets (4-8 mg) by mouth 3  times daily as needed for muscle spasms (withdrawal symptoms)  Dispense: 60 tablet; Refill: 0      Return in about 1 week (around 1/15/2021) for Follow up, with me, in person.      TT 20 min discussing interval history, current presentation, plan going forward      RUPAL BEASLEY MD   [facility]

## 2021-01-11 NOTE — PROGRESS NOTES
Met with Jeffery in clinic.    15mins    Still attempting to get into treatment here at [facility]. We looked up the waiting list which is empty at this time. Already has rule 25 and should already be sent over.

## 2021-02-15 ENCOUNTER — OFFICE VISIT (OUTPATIENT)
Dept: BEHAVIORAL HEALTH | Facility: CLINIC | Age: 36
End: 2021-02-15
Payer: COMMERCIAL

## 2021-02-15 DIAGNOSIS — F13.20 SEDATIVE, HYPNOTIC OR ANXIOLYTIC USE DISORDER, SEVERE, DEPENDENCE (H): ICD-10-CM

## 2021-02-15 DIAGNOSIS — F11.20 OPIOID USE DISORDER, SEVERE, DEPENDENCE (H): Primary | ICD-10-CM

## 2021-02-15 NOTE — PROGRESS NOTES
Pt requested to meet with me to ask questions about recommendation to continue with [facility].  I encouraged him to follow recommendations of his inpatient team and referred questions about his medications back to the prescribing providers.  He was interested in meeting with me as addiction medicine physician long term, and was given contact information for [facility] Addiction Medicine clinic to schedule.

## 2021-09-19 ENCOUNTER — HEALTH MAINTENANCE LETTER (OUTPATIENT)
Age: 36
End: 2021-09-19

## 2021-09-22 ENCOUNTER — TRANSFERRED RECORDS (OUTPATIENT)
Dept: HEALTH INFORMATION MANAGEMENT | Facility: CLINIC | Age: 36
End: 2021-09-22

## 2022-01-08 ENCOUNTER — HEALTH MAINTENANCE LETTER (OUTPATIENT)
Age: 37
End: 2022-01-08

## 2022-10-31 ENCOUNTER — OFFICE VISIT (OUTPATIENT)
Dept: URGENT CARE | Facility: URGENT CARE | Age: 37
End: 2022-10-31
Payer: COMMERCIAL

## 2022-10-31 ENCOUNTER — TELEPHONE (OUTPATIENT)
Dept: FAMILY MEDICINE | Facility: CLINIC | Age: 37
End: 2022-10-31

## 2022-10-31 VITALS
TEMPERATURE: 97.7 F | OXYGEN SATURATION: 95 % | SYSTOLIC BLOOD PRESSURE: 111 MMHG | HEART RATE: 67 BPM | DIASTOLIC BLOOD PRESSURE: 75 MMHG

## 2022-10-31 DIAGNOSIS — H93.91 EAR PROBLEM, RIGHT: Primary | ICD-10-CM

## 2022-10-31 DIAGNOSIS — H93.8X1 EAR CONGESTION, RIGHT: ICD-10-CM

## 2022-10-31 PROCEDURE — 99202 OFFICE O/P NEW SF 15 MIN: CPT | Performed by: FAMILY MEDICINE

## 2022-10-31 NOTE — TELEPHONE ENCOUNTER
Reason for Call:  Appointment Request    Patient requesting this type of appt:  Ear infection x a few days     Requested provider: any    Reason patient unable to be scheduled: Not within requested timeframe    When does patient want to be seen/preferred time: Same day    Comments: patient wondering if anyone would work him in today in [facility]. Please call    [facility] or would you prefer to receive a phone call?:   Patient would prefer a phone call   Okay to leave a detailed message?: Yes at Home number on file [phone] (home)    Call taken on 10/31/2022 at 2:30 PM by Julia Cartagena

## 2023-04-15 ENCOUNTER — HEALTH MAINTENANCE LETTER (OUTPATIENT)
Age: 38
End: 2023-04-15

## 2024-06-22 ENCOUNTER — HEALTH MAINTENANCE LETTER (OUTPATIENT)
Age: 39
End: 2024-06-22

## 2024-07-02 ENCOUNTER — LAB REQUISITION (OUTPATIENT)
Dept: LAB | Facility: CLINIC | Age: 39
End: 2024-07-02
Payer: COMMERCIAL

## 2024-07-02 DIAGNOSIS — D48.5 NEOPLASM OF UNCERTAIN BEHAVIOR OF SKIN: ICD-10-CM

## 2024-07-02 PROCEDURE — 88305 TISSUE EXAM BY PATHOLOGIST: CPT | Mod: 26 | Performed by: DERMATOLOGY

## 2024-07-02 PROCEDURE — 88305 TISSUE EXAM BY PATHOLOGIST: CPT | Mod: TC,ORL | Performed by: STUDENT IN AN ORGANIZED HEALTH CARE EDUCATION/TRAINING PROGRAM

## 2024-07-08 LAB
PATH REPORT.COMMENTS IMP SPEC: NORMAL
PATH REPORT.COMMENTS IMP SPEC: NORMAL
PATH REPORT.FINAL DX SPEC: NORMAL
PATH REPORT.GROSS SPEC: NORMAL
PATH REPORT.MICROSCOPIC SPEC OTHER STN: NORMAL
PATH REPORT.RELEVANT HX SPEC: NORMAL

## 2025-07-12 ENCOUNTER — HEALTH MAINTENANCE LETTER (OUTPATIENT)
Age: 40
End: 2025-07-12

## (undated) DEVICE — DRAPE IOBAN INCISE 10X20CM 6635

## (undated) DEVICE — Device

## (undated) DEVICE — TOURNIQUET CUFF 18" REPRO RED 60-7070-103

## (undated) DEVICE — DRAPE MAYO STAND 23X54 8337

## (undated) DEVICE — CAST PADDING 4" STERILE 9044S

## (undated) DEVICE — DRSG ADAPTIC 3X8" 6113

## (undated) DEVICE — LIGHT HANDLE X1 31140133

## (undated) DEVICE — LINEN ORTHO PACK 5446

## (undated) DEVICE — GLOVE PROTEXIS POWDER FREE 8.0 ORTHOPEDIC 2D73ET80

## (undated) DEVICE — ESU GROUND PAD UNIVERSAL W/O CORD

## (undated) DEVICE — DRAPE STOCKINETTE 4" 8544

## (undated) DEVICE — LINEN GOWN OVERSIZE 5408

## (undated) DEVICE — DRAPE CONVERTORS U-DRAPE 60X72" 8476

## (undated) DEVICE — SPECIMEN CONTAINER 5OZ STERILE 2600SA

## (undated) DEVICE — GLOVE PROTEXIS W/NEU-THERA 7.0  2D73TE70

## (undated) DEVICE — GLOVE PROTEXIS BLUE W/NEU-THERA 8.0  2D73EB80

## (undated) DEVICE — DRAPE TIBURON HAND 29427

## (undated) DEVICE — STRAP KNEE/BODY 31143004

## (undated) DEVICE — SOL NACL 0.9% IRRIG 1000ML BOTTLE 2F7124

## (undated) DEVICE — COVER CAMERA IN-LIGHT DISP LT-C02

## (undated) DEVICE — DRSG GAUZE 4X8"

## (undated) DEVICE — LINEN GOWN X4 5410

## (undated) DEVICE — SOL WATER IRRIG 1000ML BOTTLE 2F7114

## (undated) DEVICE — SUCTION MANIFOLD DORNOCH ULTRA CART UL-CL500

## (undated) DEVICE — PREP SKIN SCRUB TRAY 4461A

## (undated) DEVICE — GLOVE PROTEXIS BLUE W/NEU-THERA 7.5  2D73EB75

## (undated) DEVICE — GOWN IMPERVIOUS ZONED XLG 9041

## (undated) DEVICE — GLOVE PROTEXIS W/NEU-THERA 8.0  2D73TE80

## (undated) RX ORDER — ACETAMINOPHEN 325 MG/1
TABLET ORAL
Status: DISPENSED
Start: 2017-09-29

## (undated) RX ORDER — KETAMINE HYDROCHLORIDE 10 MG/ML
INJECTION, SOLUTION INTRAMUSCULAR; INTRAVENOUS
Status: DISPENSED
Start: 2017-09-29

## (undated) RX ORDER — GABAPENTIN 300 MG/1
CAPSULE ORAL
Status: DISPENSED
Start: 2017-09-29

## (undated) RX ORDER — FENTANYL CITRATE 50 UG/ML
INJECTION, SOLUTION INTRAMUSCULAR; INTRAVENOUS
Status: DISPENSED
Start: 2017-09-29